# Patient Record
Sex: FEMALE | Race: BLACK OR AFRICAN AMERICAN | NOT HISPANIC OR LATINO
[De-identification: names, ages, dates, MRNs, and addresses within clinical notes are randomized per-mention and may not be internally consistent; named-entity substitution may affect disease eponyms.]

---

## 2017-03-24 ENCOUNTER — APPOINTMENT (OUTPATIENT)
Dept: INTERNAL MEDICINE | Facility: CLINIC | Age: 47
End: 2017-03-24

## 2017-03-24 ENCOUNTER — NON-APPOINTMENT (OUTPATIENT)
Age: 47
End: 2017-03-24

## 2017-03-24 VITALS
BODY MASS INDEX: 33.79 KG/M2 | HEIGHT: 70 IN | HEART RATE: 76 BPM | WEIGHT: 236 LBS | DIASTOLIC BLOOD PRESSURE: 80 MMHG | SYSTOLIC BLOOD PRESSURE: 130 MMHG | RESPIRATION RATE: 12 BRPM

## 2017-03-24 VITALS — TEMPERATURE: 98.3 F

## 2017-03-24 DIAGNOSIS — Z86.2 PERSONAL HISTORY OF DISEASES OF THE BLOOD AND BLOOD-FORMING ORGANS AND CERTAIN DISORDERS INVOLVING THE IMMUNE MECHANISM: ICD-10-CM

## 2017-03-25 LAB
ALBUMIN SERPL ELPH-MCNC: 4 G/DL
ALP BLD-CCNC: 89 U/L
ALT SERPL-CCNC: 10 U/L
ANION GAP SERPL CALC-SCNC: 17 MMOL/L
APPEARANCE: CLEAR
AST SERPL-CCNC: 14 U/L
BACTERIA: ABNORMAL
BASOPHILS # BLD AUTO: 0 K/UL
BASOPHILS NFR BLD AUTO: 0 %
BILIRUB SERPL-MCNC: 0.2 MG/DL
BILIRUBIN URINE: NEGATIVE
BLOOD URINE: NEGATIVE
BUN SERPL-MCNC: 14 MG/DL
CALCIUM SERPL-MCNC: 9.8 MG/DL
CHLORIDE SERPL-SCNC: 103 MMOL/L
CHOLEST SERPL-MCNC: 193 MG/DL
CHOLEST/HDLC SERPL: 3.6 RATIO
CO2 SERPL-SCNC: 23 MMOL/L
COLOR: YELLOW
CREAT SERPL-MCNC: 1.02 MG/DL
EOSINOPHIL # BLD AUTO: 0.21 K/UL
EOSINOPHIL NFR BLD AUTO: 3.1 %
GLUCOSE QUALITATIVE U: NORMAL MG/DL
GLUCOSE SERPL-MCNC: 86 MG/DL
HBA1C MFR BLD HPLC: 6.1 %
HCT VFR BLD CALC: 41.2 %
HDLC SERPL-MCNC: 53 MG/DL
HGB BLD-MCNC: 13.8 G/DL
HYALINE CASTS: 1 /LPF
IMM GRANULOCYTES NFR BLD AUTO: 0.1 %
KETONES URINE: NEGATIVE
LDLC SERPL CALC-MCNC: 119 MG/DL
LEUKOCYTE ESTERASE URINE: NEGATIVE
LYMPHOCYTES # BLD AUTO: 3.42 K/UL
LYMPHOCYTES NFR BLD AUTO: 49.9 %
MAN DIFF?: NORMAL
MCHC RBC-ENTMCNC: 29.6 PG
MCHC RBC-ENTMCNC: 33.5 GM/DL
MCV RBC AUTO: 88.4 FL
MICROSCOPIC-UA: NORMAL
MONOCYTES # BLD AUTO: 0.3 K/UL
MONOCYTES NFR BLD AUTO: 4.4 %
NEUTROPHILS # BLD AUTO: 2.91 K/UL
NEUTROPHILS NFR BLD AUTO: 42.5 %
NITRITE URINE: NEGATIVE
PH URINE: 6
PLATELET # BLD AUTO: 303 K/UL
POTASSIUM SERPL-SCNC: 4.6 MMOL/L
PROT SERPL-MCNC: 7.4 G/DL
PROTEIN URINE: NEGATIVE MG/DL
RBC # BLD: 4.66 M/UL
RBC # FLD: 13.6 %
RED BLOOD CELLS URINE: 3 /HPF
SODIUM SERPL-SCNC: 143 MMOL/L
SPECIFIC GRAVITY URINE: 1.02
SQUAMOUS EPITHELIAL CELLS: 12 /HPF
TRIGL SERPL-MCNC: 103 MG/DL
TSH SERPL-ACNC: 0.99 UIU/ML
UROBILINOGEN URINE: 1 MG/DL
WBC # FLD AUTO: 6.85 K/UL
WHITE BLOOD CELLS URINE: 1 /HPF

## 2017-03-29 ENCOUNTER — RESULT REVIEW (OUTPATIENT)
Age: 47
End: 2017-03-29

## 2017-04-28 ENCOUNTER — LABORATORY RESULT (OUTPATIENT)
Age: 47
End: 2017-04-28

## 2017-04-28 ENCOUNTER — APPOINTMENT (OUTPATIENT)
Dept: INTERNAL MEDICINE | Facility: CLINIC | Age: 47
End: 2017-04-28

## 2017-04-28 VITALS — HEART RATE: 70 BPM | SYSTOLIC BLOOD PRESSURE: 125 MMHG | DIASTOLIC BLOOD PRESSURE: 80 MMHG

## 2017-05-04 LAB
BASOPHILS # BLD AUTO: 0 K/UL
BASOPHILS NFR BLD AUTO: 0 %
EOSINOPHIL # BLD AUTO: 0.18 K/UL
EOSINOPHIL NFR BLD AUTO: 3.6 %
HCT VFR BLD CALC: 41.2 %
HGB BLD-MCNC: 13.3 G/DL
IMM GRANULOCYTES NFR BLD AUTO: 0 %
LYMPHOCYTES # BLD AUTO: 2.38 K/UL
LYMPHOCYTES NFR BLD AUTO: 47.1 %
MAN DIFF?: NORMAL
MCHC RBC-ENTMCNC: 28.4 PG
MCHC RBC-ENTMCNC: 32.3 GM/DL
MCV RBC AUTO: 88 FL
MONOCYTES # BLD AUTO: 0.31 K/UL
MONOCYTES NFR BLD AUTO: 6.1 %
NEUTROPHILS # BLD AUTO: 2.18 K/UL
NEUTROPHILS NFR BLD AUTO: 43.2 %
PLATELET # BLD AUTO: 355 K/UL
RBC # BLD: 4.68 M/UL
RBC # FLD: 13.3 %
WBC # FLD AUTO: 5.05 K/UL

## 2017-05-05 ENCOUNTER — APPOINTMENT (OUTPATIENT)
Dept: INTERNAL MEDICINE | Facility: CLINIC | Age: 47
End: 2017-05-05

## 2017-05-05 VITALS
DIASTOLIC BLOOD PRESSURE: 80 MMHG | HEIGHT: 70 IN | BODY MASS INDEX: 32.93 KG/M2 | HEART RATE: 75 BPM | WEIGHT: 230 LBS | SYSTOLIC BLOOD PRESSURE: 130 MMHG

## 2017-08-02 ENCOUNTER — RX RENEWAL (OUTPATIENT)
Age: 47
End: 2017-08-02

## 2017-10-05 ENCOUNTER — APPOINTMENT (OUTPATIENT)
Dept: INTERNAL MEDICINE | Facility: CLINIC | Age: 47
End: 2017-10-05
Payer: COMMERCIAL

## 2017-10-05 VITALS — HEART RATE: 70 BPM | SYSTOLIC BLOOD PRESSURE: 135 MMHG | DIASTOLIC BLOOD PRESSURE: 80 MMHG

## 2017-10-05 DIAGNOSIS — H93.8X2 OTHER SPECIFIED DISORDERS OF LEFT EAR: ICD-10-CM

## 2017-10-05 PROCEDURE — 99214 OFFICE O/P EST MOD 30 MIN: CPT

## 2017-10-09 ENCOUNTER — FORM ENCOUNTER (OUTPATIENT)
Age: 47
End: 2017-10-09

## 2017-10-10 ENCOUNTER — OUTPATIENT (OUTPATIENT)
Dept: OUTPATIENT SERVICES | Facility: HOSPITAL | Age: 47
LOS: 1 days | End: 2017-10-10
Payer: COMMERCIAL

## 2017-10-10 ENCOUNTER — APPOINTMENT (OUTPATIENT)
Dept: MRI IMAGING | Facility: CLINIC | Age: 47
End: 2017-10-10
Payer: COMMERCIAL

## 2017-10-10 DIAGNOSIS — N84.0 POLYP OF CORPUS UTERI: Chronic | ICD-10-CM

## 2017-10-10 DIAGNOSIS — M25.562 PAIN IN LEFT KNEE: ICD-10-CM

## 2017-10-10 PROCEDURE — 73721 MRI JNT OF LWR EXTRE W/O DYE: CPT

## 2017-10-10 PROCEDURE — 73721 MRI JNT OF LWR EXTRE W/O DYE: CPT | Mod: 26,LT

## 2017-10-18 ENCOUNTER — RESULT REVIEW (OUTPATIENT)
Age: 47
End: 2017-10-18

## 2017-10-25 ENCOUNTER — APPOINTMENT (OUTPATIENT)
Dept: ORTHOPEDIC SURGERY | Facility: CLINIC | Age: 47
End: 2017-10-25
Payer: COMMERCIAL

## 2017-10-25 VITALS
BODY MASS INDEX: 32.93 KG/M2 | HEART RATE: 67 BPM | DIASTOLIC BLOOD PRESSURE: 80 MMHG | SYSTOLIC BLOOD PRESSURE: 123 MMHG | HEIGHT: 70 IN | WEIGHT: 230 LBS

## 2017-10-25 DIAGNOSIS — M17.12 UNILATERAL PRIMARY OSTEOARTHRITIS, LEFT KNEE: ICD-10-CM

## 2017-10-25 PROCEDURE — 99204 OFFICE O/P NEW MOD 45 MIN: CPT

## 2017-10-25 PROCEDURE — 73562 X-RAY EXAM OF KNEE 3: CPT | Mod: LT

## 2017-10-25 RX ORDER — MOMETASONE 50 UG/1
50 SPRAY, METERED NASAL DAILY
Qty: 17 | Refills: 1 | Status: DISCONTINUED | COMMUNITY
Start: 2017-05-05 | End: 2017-10-25

## 2017-12-14 ENCOUNTER — APPOINTMENT (OUTPATIENT)
Dept: INTERNAL MEDICINE | Facility: CLINIC | Age: 47
End: 2017-12-14
Payer: COMMERCIAL

## 2017-12-14 ENCOUNTER — APPOINTMENT (OUTPATIENT)
Dept: OBGYN | Facility: CLINIC | Age: 47
End: 2017-12-14

## 2017-12-14 VITALS
BODY MASS INDEX: 32.93 KG/M2 | WEIGHT: 230 LBS | HEIGHT: 70 IN | HEART RATE: 78 BPM | DIASTOLIC BLOOD PRESSURE: 80 MMHG | SYSTOLIC BLOOD PRESSURE: 125 MMHG

## 2017-12-14 PROCEDURE — 99214 OFFICE O/P EST MOD 30 MIN: CPT

## 2018-01-18 ENCOUNTER — APPOINTMENT (OUTPATIENT)
Dept: INTERNAL MEDICINE | Facility: CLINIC | Age: 48
End: 2018-01-18
Payer: COMMERCIAL

## 2018-01-18 VITALS
BODY MASS INDEX: 35.36 KG/M2 | HEART RATE: 78 BPM | SYSTOLIC BLOOD PRESSURE: 130 MMHG | DIASTOLIC BLOOD PRESSURE: 80 MMHG | OXYGEN SATURATION: 97 % | HEIGHT: 70 IN | WEIGHT: 247 LBS

## 2018-01-18 DIAGNOSIS — M25.562 PAIN IN LEFT KNEE: ICD-10-CM

## 2018-01-18 DIAGNOSIS — I07.1 RHEUMATIC TRICUSPID INSUFFICIENCY: ICD-10-CM

## 2018-01-18 DIAGNOSIS — L02.92 FURUNCLE, UNSPECIFIED: ICD-10-CM

## 2018-01-18 PROCEDURE — 99214 OFFICE O/P EST MOD 30 MIN: CPT | Mod: 25

## 2018-01-18 PROCEDURE — 36415 COLL VENOUS BLD VENIPUNCTURE: CPT

## 2018-01-18 RX ORDER — MELOXICAM 7.5 MG/1
7.5 TABLET ORAL TWICE DAILY
Qty: 60 | Refills: 1 | Status: DISCONTINUED | COMMUNITY
Start: 2017-10-25 | End: 2018-01-18

## 2018-01-19 LAB
ALBUMIN SERPL ELPH-MCNC: 3.9 G/DL
ALP BLD-CCNC: 82 U/L
ALT SERPL-CCNC: 10 U/L
ANION GAP SERPL CALC-SCNC: 15 MMOL/L
AST SERPL-CCNC: 15 U/L
BASOPHILS # BLD AUTO: 0.01 K/UL
BASOPHILS NFR BLD AUTO: 0.2 %
BILIRUB SERPL-MCNC: 0.3 MG/DL
BUN SERPL-MCNC: 11 MG/DL
CALCIUM SERPL-MCNC: 9.5 MG/DL
CHLORIDE SERPL-SCNC: 99 MMOL/L
CHOLEST SERPL-MCNC: 194 MG/DL
CHOLEST/HDLC SERPL: 4.7 RATIO
CO2 SERPL-SCNC: 23 MMOL/L
CREAT SERPL-MCNC: 1.03 MG/DL
EOSINOPHIL # BLD AUTO: 0.19 K/UL
EOSINOPHIL NFR BLD AUTO: 2.9 %
GLUCOSE SERPL-MCNC: 158 MG/DL
HBA1C MFR BLD HPLC: 6.2 %
HCT VFR BLD CALC: 39.9 %
HDLC SERPL-MCNC: 41 MG/DL
HGB BLD-MCNC: 12.7 G/DL
IMM GRANULOCYTES NFR BLD AUTO: 0.2 %
LDLC SERPL CALC-MCNC: 119 MG/DL
LYMPHOCYTES # BLD AUTO: 3.28 K/UL
LYMPHOCYTES NFR BLD AUTO: 50.2 %
MAN DIFF?: NORMAL
MCHC RBC-ENTMCNC: 28.3 PG
MCHC RBC-ENTMCNC: 31.8 GM/DL
MCV RBC AUTO: 88.9 FL
MONOCYTES # BLD AUTO: 0.24 K/UL
MONOCYTES NFR BLD AUTO: 3.7 %
NEUTROPHILS # BLD AUTO: 2.8 K/UL
NEUTROPHILS NFR BLD AUTO: 42.8 %
PLATELET # BLD AUTO: 314 K/UL
POTASSIUM SERPL-SCNC: 4 MMOL/L
PROT SERPL-MCNC: 7.3 G/DL
RBC # BLD: 4.49 M/UL
RBC # FLD: 13.5 %
SODIUM SERPL-SCNC: 137 MMOL/L
TRIGL SERPL-MCNC: 172 MG/DL
TSH SERPL-ACNC: 1.12 UIU/ML
WBC # FLD AUTO: 6.53 K/UL

## 2018-01-26 ENCOUNTER — RESULT REVIEW (OUTPATIENT)
Age: 48
End: 2018-01-26

## 2018-04-12 ENCOUNTER — APPOINTMENT (OUTPATIENT)
Dept: INTERNAL MEDICINE | Facility: CLINIC | Age: 48
End: 2018-04-12
Payer: COMMERCIAL

## 2018-04-12 ENCOUNTER — NON-APPOINTMENT (OUTPATIENT)
Age: 48
End: 2018-04-12

## 2018-04-12 VITALS
HEART RATE: 62 BPM | RESPIRATION RATE: 14 BRPM | WEIGHT: 247 LBS | DIASTOLIC BLOOD PRESSURE: 78 MMHG | SYSTOLIC BLOOD PRESSURE: 116 MMHG | HEIGHT: 70 IN | BODY MASS INDEX: 35.36 KG/M2

## 2018-04-12 PROCEDURE — 99396 PREV VISIT EST AGE 40-64: CPT | Mod: 25

## 2018-04-12 PROCEDURE — 93000 ELECTROCARDIOGRAM COMPLETE: CPT

## 2018-04-12 PROCEDURE — 36415 COLL VENOUS BLD VENIPUNCTURE: CPT

## 2018-04-12 RX ORDER — CEPHALEXIN 500 MG/1
500 CAPSULE ORAL
Qty: 28 | Refills: 0 | Status: DISCONTINUED | COMMUNITY
Start: 2017-12-11

## 2018-04-12 RX ORDER — SULFAMETHOXAZOLE AND TRIMETHOPRIM 800; 160 MG/1; MG/1
800-160 TABLET ORAL
Qty: 14 | Refills: 0 | Status: DISCONTINUED | COMMUNITY
Start: 2017-12-11

## 2018-04-13 ENCOUNTER — RESULT REVIEW (OUTPATIENT)
Age: 48
End: 2018-04-13

## 2018-04-13 LAB
ALBUMIN SERPL ELPH-MCNC: 4.1 G/DL
ALP BLD-CCNC: 85 U/L
ALT SERPL-CCNC: 8 U/L
ANION GAP SERPL CALC-SCNC: 12 MMOL/L
APPEARANCE: CLEAR
AST SERPL-CCNC: 11 U/L
BACTERIA: NEGATIVE
BASOPHILS # BLD AUTO: 0.01 K/UL
BASOPHILS NFR BLD AUTO: 0.2 %
BILIRUB SERPL-MCNC: 0.3 MG/DL
BILIRUBIN URINE: NEGATIVE
BLOOD URINE: NEGATIVE
BUN SERPL-MCNC: 15 MG/DL
CALCIUM SERPL-MCNC: 9.7 MG/DL
CHLORIDE SERPL-SCNC: 101 MMOL/L
CHOLEST SERPL-MCNC: 177 MG/DL
CHOLEST/HDLC SERPL: 3.3 RATIO
CO2 SERPL-SCNC: 26 MMOL/L
COLOR: YELLOW
CREAT SERPL-MCNC: 0.96 MG/DL
EOSINOPHIL # BLD AUTO: 0.21 K/UL
EOSINOPHIL NFR BLD AUTO: 3.8 %
GLUCOSE QUALITATIVE U: NEGATIVE MG/DL
GLUCOSE SERPL-MCNC: 105 MG/DL
HBA1C MFR BLD HPLC: 6.2 %
HCT VFR BLD CALC: 40.9 %
HDLC SERPL-MCNC: 54 MG/DL
HGB BLD-MCNC: 13.5 G/DL
HYALINE CASTS: 0 /LPF
IMM GRANULOCYTES NFR BLD AUTO: 0.2 %
KETONES URINE: NEGATIVE
LDLC SERPL CALC-MCNC: 104 MG/DL
LEUKOCYTE ESTERASE URINE: NEGATIVE
LYMPHOCYTES # BLD AUTO: 2.61 K/UL
LYMPHOCYTES NFR BLD AUTO: 46.9 %
MAN DIFF?: NORMAL
MCHC RBC-ENTMCNC: 29.4 PG
MCHC RBC-ENTMCNC: 33 GM/DL
MCV RBC AUTO: 89.1 FL
MICROSCOPIC-UA: NORMAL
MONOCYTES # BLD AUTO: 0.33 K/UL
MONOCYTES NFR BLD AUTO: 5.9 %
NEUTROPHILS # BLD AUTO: 2.39 K/UL
NEUTROPHILS NFR BLD AUTO: 43 %
NITRITE URINE: NEGATIVE
PH URINE: 5.5
PLATELET # BLD AUTO: 330 K/UL
POTASSIUM SERPL-SCNC: 4.6 MMOL/L
PROT SERPL-MCNC: 7.2 G/DL
PROTEIN URINE: NEGATIVE MG/DL
RBC # BLD: 4.59 M/UL
RBC # FLD: 13.6 %
RED BLOOD CELLS URINE: 0 /HPF
SODIUM SERPL-SCNC: 139 MMOL/L
SPECIFIC GRAVITY URINE: 1.02
SQUAMOUS EPITHELIAL CELLS: 8 /HPF
TRIGL SERPL-MCNC: 96 MG/DL
TSH SERPL-ACNC: 1.34 UIU/ML
UROBILINOGEN URINE: NEGATIVE MG/DL
WBC # FLD AUTO: 5.56 K/UL
WHITE BLOOD CELLS URINE: 0 /HPF

## 2018-04-23 ENCOUNTER — TRANSCRIPTION ENCOUNTER (OUTPATIENT)
Age: 48
End: 2018-04-23

## 2018-09-03 ENCOUNTER — TRANSCRIPTION ENCOUNTER (OUTPATIENT)
Age: 48
End: 2018-09-03

## 2018-09-03 PROBLEM — I07.1 MILD TRICUSPID REGURGITATION: Status: ACTIVE | Noted: 2018-01-18

## 2018-10-09 ENCOUNTER — APPOINTMENT (OUTPATIENT)
Dept: INTERNAL MEDICINE | Facility: CLINIC | Age: 48
End: 2018-10-09

## 2019-05-06 ENCOUNTER — APPOINTMENT (OUTPATIENT)
Dept: INTERNAL MEDICINE | Facility: CLINIC | Age: 49
End: 2019-05-06
Payer: COMMERCIAL

## 2019-05-06 ENCOUNTER — NON-APPOINTMENT (OUTPATIENT)
Age: 49
End: 2019-05-06

## 2019-05-06 VITALS
WEIGHT: 255.5 LBS | DIASTOLIC BLOOD PRESSURE: 76 MMHG | HEIGHT: 70 IN | SYSTOLIC BLOOD PRESSURE: 128 MMHG | BODY MASS INDEX: 36.58 KG/M2 | HEART RATE: 71 BPM | RESPIRATION RATE: 14 BRPM

## 2019-05-06 LAB — CYTOLOGY CVX/VAG DOC THIN PREP: NORMAL

## 2019-05-06 PROCEDURE — 36415 COLL VENOUS BLD VENIPUNCTURE: CPT

## 2019-05-06 PROCEDURE — 99396 PREV VISIT EST AGE 40-64: CPT | Mod: 25

## 2019-05-06 PROCEDURE — 93000 ELECTROCARDIOGRAM COMPLETE: CPT

## 2019-05-06 NOTE — PHYSICAL EXAM
[General Appearance - Alert] : alert [General Appearance - In No Acute Distress] : in no acute distress [Sclera] : the sclera and conjunctiva were normal [Extraocular Movements] : extraocular movements were intact [PERRL With Normal Accommodation] : pupils were equal in size, round, and reactive to light [Outer Ear] : the ears and nose were normal in appearance [Oropharynx] : the oropharynx was normal [Neck Appearance] : the appearance of the neck was normal [Neck Cervical Mass (___cm)] : no neck mass was observed [Jugular Venous Distention Increased] : there was no jugular-venous distention [Thyroid Diffuse Enlargement] : the thyroid was not enlarged [Auscultation Breath Sounds / Voice Sounds] : lungs were clear to auscultation bilaterally [Heart Rate And Rhythm] : heart rate was normal and rhythm regular [Heart Sounds] : normal S1 and S2 [Heart Sounds Gallop] : no gallops [Murmurs] : no murmurs [Heart Sounds Pericardial Friction Rub] : no pericardial rub [Arterial Pulses Carotid] : carotid pulses were normal with no bruits [Abdominal Aorta] : the abdominal aorta was normal [Full Pulse] : the pedal pulses are present [Edema] : there was no peripheral edema [Bowel Sounds] : normal bowel sounds [Abdomen Soft] : soft [Abdomen Tenderness] : non-tender [Abdomen Mass (___ Cm)] : no abdominal mass palpated [Cervical Lymph Nodes Enlarged Posterior Bilaterally] : posterior cervical [Cervical Lymph Nodes Enlarged Anterior Bilaterally] : anterior cervical [Supraclavicular Lymph Nodes Enlarged Bilaterally] : supraclavicular [Axillary Lymph Nodes Enlarged Bilaterally] : axillary [Femoral Lymph Nodes Enlarged Bilaterally] : femoral [Inguinal Lymph Nodes Enlarged Bilaterally] : inguinal [No Spinal Tenderness] : no spinal tenderness [Abnormal Walk] : normal gait [Nail Clubbing] : no clubbing  or cyanosis of the fingernails [Musculoskeletal - Swelling] : no joint swelling seen [Motor Tone] : muscle strength and tone were normal [Skin Color & Pigmentation] : normal skin color and pigmentation [Skin Turgor] : normal skin turgor [] : no rash [Cranial Nerves] : cranial nerves 2-12 were intact [No Focal Deficits] : no focal deficits [Oriented To Time, Place, And Person] : oriented to person, place, and time [Impaired Insight] : insight and judgment were intact [Affect] : the affect was normal [FreeTextEntry1] : Umbilical keloids [Over the Past 2 Weeks, Have You Felt Down, Depressed, or Hopeless?] : 1.) Over the past 2 weeks, have you felt down, depressed, or hopeless? No [Over the Past 2 Weeks, Have You Felt Little Interest or Pleasure Doing Things?] : 2.) Over the past 2 weeks, have you felt little interest or pleasure doing things? No

## 2019-05-06 NOTE — REVIEW OF SYSTEMS
[Feeling Tired] : feeling tired [Recent Weight Gain (___ Lbs)] : recent [unfilled] ~Ulb weight gain [Negative] : Heme/Lymph [Fever] : no fever [Chills] : no chills [Feeling Poorly] : not feeling poorly

## 2019-05-06 NOTE — HISTORY OF PRESENT ILLNESS
[Weight Gain ___ Pounds] : Weight gain of [unfilled] pounds [None] : No weight lost attempts [Sedentary] : Patient is sedentary [Contemplation] : Contemplation: patient is considering to lose weight within the next 6 months, patient did not attempt to lose weight in the last year. [Needs reinforcement, provided] : Patient needs reinforcement on understanding of disease, goals and obesity follow-up plan; reinforcement was provided [FreeTextEntry1] : 49-year-old female in for her yearly physical.\par \par  Patient generally feeling well without particular complaints but continues with no lifestyle changes.Therefore no weight loss\par \par Patient with known history of prediabetes with significant increased risk of diabetes secondary to family history and history of gestational diabetes.\par \par Patient has significant dysfunctional uterine bleeding and ultimately had a robotic assisted hysterectomy October 2015 with resolution of her anemia.\par \par Patient is generally feeling well without complaints including no chest pain, palpitations, shortness of breath or edema.\par \par The only active issue is the patient has a left thyroid nodule for which she was following with endocrinologist Dr. Leahy.She had an FNA biopsy June of 2017= benign.\par Last year the patient was given a referral for a thyroid ultrasound which she did not do.\par \par The patient had was a partial torn meniscus of her left knee October 2017.\par For this she saw orthopedic Dr Starkey who referred her for physical therapy which she did not do and her knee has improved.\par Her knee is bothering her again and is contemplating physical therapy.\par \par The patient does complain of a discomfort of her upper mid abdomen with a bulge and is concerned about a hernia. She states has gotten bigger.

## 2019-05-06 NOTE — HEALTH RISK ASSESSMENT
[Fair] : ~his/her~ current health as fair  [Very Good] : ~his/her~  mood as very good [No falls in past year] : Patient reported no falls in the past year [0] : 2) Feeling down, depressed, or hopeless: Not at all (0) [None] : None [With Significant Other] : lives with significant other [With Family] : lives with family [# of Members in Household ___] :  household currently consist of [unfilled] member(s) [Employed] : employed [College] : College [] :  [# Of Children ___] : has [unfilled] children [Sexually Active] : sexually active [Feels Safe at Home] : Feels safe at home [Fully functional (bathing, dressing, toileting, transferring, walking, feeding)] : Fully functional (bathing, dressing, toileting, transferring, walking, feeding) [Fully functional (using the telephone, shopping, preparing meals, housekeeping, doing laundry, using] : Fully functional and needs no help or supervision to perform IADLs (using the telephone, shopping, preparing meals, housekeeping, doing laundry, using transportation, managing medications and managing finances) [Reports changes in vision] : Reports changes in vision [Smoke Detector] : smoke detector [Carbon Monoxide Detector] : carbon monoxide detector [Guns at Home] : guns at home [Seat Belt] :  uses seat belt [Reviewed no changes] : Reviewed no changes [Discussed at today's visit] : Advance Directives Discussed at today's visit [] : No [[id number]] : 0 [Change in mental status noted] : No change in mental status noted [Reports changes in hearing] : Reports no changes in hearing [Reports changes in dental health] : Reports no changes in dental health [Sunscreen] : does not use sunscreen [FreeTextEntry2] : 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

## 2019-05-06 NOTE — COUNSELING
[Weight management counseling provided] : Weight management [Healthy eating counseling provided] : healthy eating [Activity counseling provided] : activity [Low Fat Diet] : Low fat diet [Decrease Portions] : Decrease food portions [None] : None [Needs reinforcement, provided] : Patient needs reinforcement on understanding lifestyle changes and  the steps needed to achieve self management goals and reinforcement was provided [ - Behavioral Counseling for Obesity, Group (2-10) 30 Minutes] : Behavioral Counseling for Obesity, Group (2-10) 30 Minutes

## 2019-05-06 NOTE — ASSESSMENT
[FreeTextEntry1] : 49-year-old female with known prediabetes at increased risk for type 2 diabetes continues to need lifestyle changes with diet and exercise. Patient again encouraged a low carb diet with regular exercise and resultant weight loss. Goal is for a 10-15 pound weight loss in the next 3-4 months.\par \par Questionable upper abdominal ventral hernia therefore referred to surgery for evaluation\par \par Thyroid sonogram done November 2016. Referral again given for followup\par Echocardiogram September 2015\par Mammography November 2016. Referral again given\par GYN followup scheduled\par GI referral given for colonoscopy\par Patient referred to nutritionist\par \par Followup yearly and as needed

## 2019-05-13 LAB
ALBUMIN SERPL ELPH-MCNC: 4.3 G/DL
ALP BLD-CCNC: 104 U/L
ALT SERPL-CCNC: 17 U/L
ANION GAP SERPL CALC-SCNC: 13 MMOL/L
APPEARANCE: ABNORMAL
AST SERPL-CCNC: 15 U/L
BACTERIA: ABNORMAL
BASOPHILS # BLD AUTO: 0.01 K/UL
BASOPHILS NFR BLD AUTO: 0.2 %
BILIRUB SERPL-MCNC: 0.5 MG/DL
BILIRUBIN URINE: NEGATIVE
BLOOD URINE: NEGATIVE
BUN SERPL-MCNC: 14 MG/DL
CALCIUM SERPL-MCNC: 9.4 MG/DL
CHLORIDE SERPL-SCNC: 99 MMOL/L
CHOLEST SERPL-MCNC: 191 MG/DL
CHOLEST/HDLC SERPL: 4.1 RATIO
CO2 SERPL-SCNC: 26 MMOL/L
COLOR: YELLOW
CREAT SERPL-MCNC: 0.94 MG/DL
EOSINOPHIL # BLD AUTO: 0.17 K/UL
EOSINOPHIL NFR BLD AUTO: 2.8 %
ESTIMATED AVERAGE GLUCOSE: 163 MG/DL
GLUCOSE QUALITATIVE U: NEGATIVE
GLUCOSE SERPL-MCNC: 131 MG/DL
HBA1C MFR BLD HPLC: 7.3 %
HCT VFR BLD CALC: 44.4 %
HDLC SERPL-MCNC: 47 MG/DL
HGB BLD-MCNC: 13.6 G/DL
HYALINE CASTS: 6 /LPF
IMM GRANULOCYTES NFR BLD AUTO: 0.2 %
KETONES URINE: NEGATIVE
LDLC SERPL CALC-MCNC: 125 MG/DL
LEUKOCYTE ESTERASE URINE: NEGATIVE
LYMPHOCYTES # BLD AUTO: 2.85 K/UL
LYMPHOCYTES NFR BLD AUTO: 46.6 %
MAN DIFF?: NORMAL
MCHC RBC-ENTMCNC: 28 PG
MCHC RBC-ENTMCNC: 30.6 GM/DL
MCV RBC AUTO: 91.5 FL
MICROSCOPIC-UA: NORMAL
MONOCYTES # BLD AUTO: 0.31 K/UL
MONOCYTES NFR BLD AUTO: 5.1 %
NEUTROPHILS # BLD AUTO: 2.76 K/UL
NEUTROPHILS NFR BLD AUTO: 45.1 %
NITRITE URINE: NEGATIVE
PH URINE: 6.5
PLATELET # BLD AUTO: 327 K/UL
POTASSIUM SERPL-SCNC: 4.2 MMOL/L
PROT SERPL-MCNC: 7 G/DL
PROTEIN URINE: NORMAL
RBC # BLD: 4.85 M/UL
RBC # FLD: 13.1 %
RED BLOOD CELLS URINE: 6 /HPF
SODIUM SERPL-SCNC: 138 MMOL/L
SPECIFIC GRAVITY URINE: 1.02
SQUAMOUS EPITHELIAL CELLS: 11 /HPF
TRIGL SERPL-MCNC: 95 MG/DL
TSH SERPL-ACNC: 0.79 UIU/ML
UROBILINOGEN URINE: NORMAL
WBC # FLD AUTO: 6.11 K/UL
WHITE BLOOD CELLS URINE: 5 /HPF

## 2019-06-19 ENCOUNTER — APPOINTMENT (OUTPATIENT)
Dept: OBGYN | Facility: CLINIC | Age: 49
End: 2019-06-19
Payer: COMMERCIAL

## 2019-06-19 ENCOUNTER — APPOINTMENT (OUTPATIENT)
Dept: SURGERY | Facility: CLINIC | Age: 49
End: 2019-06-19
Payer: COMMERCIAL

## 2019-06-19 VITALS
OXYGEN SATURATION: 97 % | HEIGHT: 70 IN | BODY MASS INDEX: 36.79 KG/M2 | SYSTOLIC BLOOD PRESSURE: 138 MMHG | HEART RATE: 64 BPM | WEIGHT: 257 LBS | DIASTOLIC BLOOD PRESSURE: 89 MMHG | TEMPERATURE: 98.3 F

## 2019-06-19 VITALS
HEIGHT: 70 IN | BODY MASS INDEX: 36.65 KG/M2 | HEART RATE: 71 BPM | WEIGHT: 256 LBS | DIASTOLIC BLOOD PRESSURE: 85 MMHG | SYSTOLIC BLOOD PRESSURE: 130 MMHG

## 2019-06-19 DIAGNOSIS — R10.9 UNSPECIFIED ABDOMINAL PAIN: ICD-10-CM

## 2019-06-19 DIAGNOSIS — R19.00 INTRA-ABDOMINAL AND PELVIC SWELLING, MASS AND LUMP, UNSPECIFIED SITE: ICD-10-CM

## 2019-06-19 DIAGNOSIS — R23.2 FLUSHING: ICD-10-CM

## 2019-06-19 PROCEDURE — 99244 OFF/OP CNSLTJ NEW/EST MOD 40: CPT

## 2019-06-19 PROCEDURE — 99214 OFFICE O/P EST MOD 30 MIN: CPT

## 2019-06-19 NOTE — PHYSICAL EXAM
[No Rash or Lesion] : No rash or lesion [JVD] : no jugular venous distention  [Petechiae] : no petechiae [Purpura] : no purpura  [Skin Induration] : no induration [Skin Ulcer] : no ulcer [Alert] : alert [Oriented to Person] : oriented to person [Oriented to Place] : oriented to place [Calm] : calm [Oriented to Time] : oriented to time [de-identified] : non toxic, in no acute distress  [de-identified] : trachea midline, no gross mass  [de-identified] : NC/AT PERRL EOMI no scleral icterus  [de-identified] : no audible wheezing or stridor  [de-identified] : FROM of all extremities with no gross deformity or angulation there is no lymphadenopathy, no angulation, there is bulge of the upper abdomen that appears to be consistent with diastasis rectus, there is no clear fascial defect to suggest a ventral hernia however the exam is limited by the patient's body habitus  [de-identified] : obese soft, no localizing tenderness, no guarding, no rebound, no masses  [de-identified] : mood is calm

## 2019-06-19 NOTE — HISTORY OF PRESENT ILLNESS
[de-identified] : The patient comes to the office in consultation by Dr. Slim Crystal for evaluation of an abdominal wall bulge and intermittent burning pain in the upper abdominal area.  The patient reports that with exertion she experiences pain following eating at times and then with exertion. She notes that her upper abdomen will pop out and bulge thru her clothing.  She has noted this become worse over the past 3 to 6 months.  She denies any significant change in her weight. She has no nausea, no vomit, no changes in her bowel function.

## 2019-06-19 NOTE — CONSULT LETTER
[Dear  ___] : Dear  [unfilled], [Consult Letter:] : I had the pleasure of evaluating your patient, [unfilled]. [( Thank you for referring [unfilled] for consultation for _____ )] : Thank you for referring [unfilled] for consultation for [unfilled] [Please see my note below.] : Please see my note below. [Consult Closing:] : Thank you very much for allowing me to participate in the care of this patient.  If you have any questions, please do not hesitate to contact me. [FreeTextEntry3] : Darryn Esquivel MD, FACS\par  of Surgery\par [facility]\par  [Sincerely,] : Sincerely,

## 2019-06-19 NOTE — ASSESSMENT
[FreeTextEntry1] : The patient is a 49 year old female with a bulge and intermittent burning pain in the upper abdominal area. There is clinically a component of diastasis rectus.  The possibility of an underlying ventral hernia is suspected based on the pain, but exam is limited by the patient's body habitus.  The patient will benefit from a CT scan to exclude a hernia.  The patient has been advised that weight loss will be an important adjunct to help potentially reduce the  the tension along the abdominal wall that will potentially lead to worsening and potential herniation of the abdominal wall.  She will return upon completion of the CT scan for further recommendations. \par

## 2019-06-20 ENCOUNTER — RESULT REVIEW (OUTPATIENT)
Age: 49
End: 2019-06-20

## 2019-06-20 LAB
APPEARANCE: CLEAR
BACTERIA: NEGATIVE
BILIRUBIN URINE: NEGATIVE
BLOOD URINE: NEGATIVE
C TRACH RRNA SPEC QL NAA+PROBE: NOT DETECTED
COLOR: YELLOW
GLUCOSE QUALITATIVE U: NEGATIVE
HPV HIGH+LOW RISK DNA PNL CVX: NOT DETECTED
HYALINE CASTS: 3 /LPF
KETONES URINE: NEGATIVE
LEUKOCYTE ESTERASE URINE: NEGATIVE
MICROSCOPIC-UA: NORMAL
N GONORRHOEA RRNA SPEC QL NAA+PROBE: NOT DETECTED
NITRITE URINE: NEGATIVE
PH URINE: 5.5
PROTEIN URINE: NORMAL
RED BLOOD CELLS URINE: 1 /HPF
SOURCE TP AMPLIFICATION: NORMAL
SPECIFIC GRAVITY URINE: 1.02
SQUAMOUS EPITHELIAL CELLS: 5 /HPF
UROBILINOGEN URINE: NORMAL
WHITE BLOOD CELLS URINE: 1 /HPF

## 2019-06-21 ENCOUNTER — OUTPATIENT (OUTPATIENT)
Dept: OUTPATIENT SERVICES | Facility: HOSPITAL | Age: 49
LOS: 1 days | End: 2019-06-21
Payer: COMMERCIAL

## 2019-06-21 ENCOUNTER — APPOINTMENT (OUTPATIENT)
Dept: CT IMAGING | Facility: CLINIC | Age: 49
End: 2019-06-21
Payer: COMMERCIAL

## 2019-06-21 DIAGNOSIS — Z00.8 ENCOUNTER FOR OTHER GENERAL EXAMINATION: ICD-10-CM

## 2019-06-21 DIAGNOSIS — N84.0 POLYP OF CORPUS UTERI: Chronic | ICD-10-CM

## 2019-06-21 DIAGNOSIS — R19.00 INTRA-ABDOMINAL AND PELVIC SWELLING, MASS AND LUMP, UNSPECIFIED SITE: ICD-10-CM

## 2019-06-21 DIAGNOSIS — R10.9 UNSPECIFIED ABDOMINAL PAIN: ICD-10-CM

## 2019-06-21 PROCEDURE — 74177 CT ABD & PELVIS W/CONTRAST: CPT | Mod: 26

## 2019-06-21 PROCEDURE — 74177 CT ABD & PELVIS W/CONTRAST: CPT

## 2019-06-24 LAB — CYTOLOGY CVX/VAG DOC THIN PREP: NORMAL

## 2019-06-26 ENCOUNTER — APPOINTMENT (OUTPATIENT)
Dept: CHRONIC DISEASE MANAGEMENT | Facility: CLINIC | Age: 49
End: 2019-06-26

## 2019-07-03 ENCOUNTER — APPOINTMENT (OUTPATIENT)
Dept: CHRONIC DISEASE MANAGEMENT | Facility: CLINIC | Age: 49
End: 2019-07-03
Payer: COMMERCIAL

## 2019-07-03 VITALS — HEIGHT: 70 IN | BODY MASS INDEX: 36.65 KG/M2 | WEIGHT: 256 LBS

## 2019-07-03 PROCEDURE — G0108 DIAB MANAGE TRN  PER INDIV: CPT

## 2019-07-16 ENCOUNTER — APPOINTMENT (OUTPATIENT)
Dept: INTERNAL MEDICINE | Facility: CLINIC | Age: 49
End: 2019-07-16
Payer: COMMERCIAL

## 2019-07-16 VITALS
WEIGHT: 256 LBS | SYSTOLIC BLOOD PRESSURE: 125 MMHG | HEIGHT: 70 IN | BODY MASS INDEX: 36.65 KG/M2 | HEART RATE: 71 BPM | OXYGEN SATURATION: 97 % | DIASTOLIC BLOOD PRESSURE: 85 MMHG

## 2019-07-16 DIAGNOSIS — R73.03 PREDIABETES.: ICD-10-CM

## 2019-07-16 PROCEDURE — 99213 OFFICE O/P EST LOW 20 MIN: CPT | Mod: 25

## 2019-07-16 PROCEDURE — 36415 COLL VENOUS BLD VENIPUNCTURE: CPT

## 2019-07-16 RX ORDER — CETIRIZINE HYDROCHLORIDE 10 MG/1
10 TABLET, FILM COATED ORAL
Refills: 0 | Status: DISCONTINUED | COMMUNITY
Start: 2019-05-06 | End: 2019-07-16

## 2019-07-17 DIAGNOSIS — H40.9 UNSPECIFIED GLAUCOMA: ICD-10-CM

## 2019-07-17 LAB
ALBUMIN SERPL ELPH-MCNC: 4.4 G/DL
ALP BLD-CCNC: 84 U/L
ALT SERPL-CCNC: 23 U/L
ANION GAP SERPL CALC-SCNC: 15 MMOL/L
AST SERPL-CCNC: 21 U/L
BASOPHILS # BLD AUTO: 0.01 K/UL
BASOPHILS NFR BLD AUTO: 0.2 %
BILIRUB SERPL-MCNC: 0.4 MG/DL
BUN SERPL-MCNC: 13 MG/DL
CALCIUM SERPL-MCNC: 9.6 MG/DL
CHLORIDE SERPL-SCNC: 103 MMOL/L
CHOLEST SERPL-MCNC: 177 MG/DL
CHOLEST/HDLC SERPL: 4.3 RATIO
CO2 SERPL-SCNC: 24 MMOL/L
CREAT SERPL-MCNC: 0.95 MG/DL
CREAT SPEC-SCNC: 152 MG/DL
EOSINOPHIL # BLD AUTO: 0.17 K/UL
EOSINOPHIL NFR BLD AUTO: 3.1 %
ESTIMATED AVERAGE GLUCOSE: 157 MG/DL
GLUCOSE SERPL-MCNC: 123 MG/DL
HBA1C MFR BLD HPLC: 7.1 %
HCT VFR BLD CALC: 42.4 %
HDLC SERPL-MCNC: 41 MG/DL
HGB BLD-MCNC: 13.3 G/DL
IMM GRANULOCYTES NFR BLD AUTO: 0.2 %
LDLC SERPL CALC-MCNC: 115 MG/DL
LYMPHOCYTES # BLD AUTO: 2.8 K/UL
LYMPHOCYTES NFR BLD AUTO: 50.8 %
MAN DIFF?: NORMAL
MCHC RBC-ENTMCNC: 28.7 PG
MCHC RBC-ENTMCNC: 31.4 GM/DL
MCV RBC AUTO: 91.6 FL
MICROALBUMIN 24H UR DL<=1MG/L-MCNC: 4.2 MG/DL
MICROALBUMIN/CREAT 24H UR-RTO: 28 MG/G
MONOCYTES # BLD AUTO: 0.33 K/UL
MONOCYTES NFR BLD AUTO: 6 %
NEUTROPHILS # BLD AUTO: 2.19 K/UL
NEUTROPHILS NFR BLD AUTO: 39.7 %
PLATELET # BLD AUTO: 324 K/UL
POTASSIUM SERPL-SCNC: 4.1 MMOL/L
PROT SERPL-MCNC: 7.4 G/DL
RBC # BLD: 4.63 M/UL
RBC # FLD: 13.1 %
SODIUM SERPL-SCNC: 142 MMOL/L
TRIGL SERPL-MCNC: 105 MG/DL
TSH SERPL-ACNC: 0.66 UIU/ML
WBC # FLD AUTO: 5.51 K/UL

## 2019-07-24 PROBLEM — H40.9 GLAUCOMA: Status: ACTIVE | Noted: 2019-07-24

## 2019-07-29 ENCOUNTER — RESULT REVIEW (OUTPATIENT)
Age: 49
End: 2019-07-29

## 2019-07-29 NOTE — ADDENDUM
[FreeTextEntry1] : Labs show\par -Cholesterol/ diabetes still uncontrolled= again recommend starting Crestor and metformin=PT DECLINES

## 2019-07-29 NOTE — ASSESSMENT
[FreeTextEntry1] : Labs sent out and further recommendations will be made based on lab results.Patient continues to need lifestyle changes with weight loss. Patient will return to office in 3-4 months\par \par \par echo was 9/15 with mild MR/mild TR-Rx given for follow up\par Thyroid sonogram=UTD per pt-to call for report\par Mammogram was 5/2019\par specialists include\par 1. GYN=Dr. Storey\par 2. Endocrinology-Dr. Leahy-prn-to go if thyroid sono changes\par 3.hematology-Dr. Junior-declines follow up\par 4. Orthopedic-Dr. Starkey-no need for follow up"knee is fine now"\par 5.ophthalmology-Dr. Roberts-11/2018\par 6.Surgeon-Dr. Esquivel\par declines flu vaccine,TDAP is UTD/discussed pneumococcal vaccine\par colonoscopy-has consult apt sched\par Microalbumin sent out

## 2019-07-29 NOTE — HISTORY OF PRESENT ILLNESS
[FreeTextEntry1] : Patient presents for followup on fatty liver/T2DM/obesity. Patient is trying to improve her fatty liver/T2DM with lifestyle changes as well as her obesity.\par -Patient continues on no daily medications\par -Patient was given metformin for type 2 diabetes and Crestor for hyperlipidemia which she elected not to take

## 2019-08-12 ENCOUNTER — APPOINTMENT (OUTPATIENT)
Dept: GASTROENTEROLOGY | Facility: CLINIC | Age: 49
End: 2019-08-12
Payer: COMMERCIAL

## 2019-08-12 ENCOUNTER — APPOINTMENT (OUTPATIENT)
Dept: SURGERY | Facility: CLINIC | Age: 49
End: 2019-08-12
Payer: COMMERCIAL

## 2019-08-12 VITALS
RESPIRATION RATE: 14 BRPM | SYSTOLIC BLOOD PRESSURE: 142 MMHG | DIASTOLIC BLOOD PRESSURE: 94 MMHG | HEART RATE: 70 BPM | OXYGEN SATURATION: 99 % | WEIGHT: 255 LBS | HEIGHT: 70 IN | BODY MASS INDEX: 36.51 KG/M2

## 2019-08-12 VITALS
DIASTOLIC BLOOD PRESSURE: 82 MMHG | OXYGEN SATURATION: 99 % | SYSTOLIC BLOOD PRESSURE: 118 MMHG | BODY MASS INDEX: 36.94 KG/M2 | HEART RATE: 78 BPM | TEMPERATURE: 98.5 F | HEIGHT: 70 IN | WEIGHT: 258 LBS | RESPIRATION RATE: 16 BRPM

## 2019-08-12 DIAGNOSIS — K43.9 VENTRAL HERNIA W/OUT OBSTRUCTION OR GANGRENE: ICD-10-CM

## 2019-08-12 DIAGNOSIS — K43.2 INCISIONAL HERNIA W/OUT OBSTRUCTION OR GANGRENE: ICD-10-CM

## 2019-08-12 PROCEDURE — 82272 OCCULT BLD FECES 1-3 TESTS: CPT

## 2019-08-12 PROCEDURE — 99214 OFFICE O/P EST MOD 30 MIN: CPT

## 2019-08-12 PROCEDURE — 99202 OFFICE O/P NEW SF 15 MIN: CPT

## 2019-08-12 NOTE — DATA REVIEWED
[FreeTextEntry1] : Independent review of the abd/pel CT scan reveals a fat containing ventral hernia 5 cm superior the umbilicus where there is a fat containing umbilical incisional hernia, there is no bowel obstruction or bowel thickening.

## 2019-08-12 NOTE — HISTORY OF PRESENT ILLNESS
[de-identified] : The patient returns to the office with no new complaints.  She states that the bulge has remained stable in size and is without pain.  She reports that she has not been able to lose any weight at this point as of yet.  She continues to go to the gym to afford weight loss. She has no nausea, no vomit, and no changes in her bowel function.

## 2019-08-12 NOTE — ASSESSMENT
[FreeTextEntry1] : The patient is an obese 49 year old female with a ventral hernia and umbilical incisional hernia. The patient has been advised that weight loss will be an important adjunct to help potentially reduce the risks of infection, hernia recurrence, and chronic post operative pain associated with surgery by potentially reducing the tension along the abdominal wall.  The risks, benefits, and alternatives including the option of doing nothing to a robotic, possible laparoscopic, and possible open incisional hernia repair with  mesh were discussed.  The potential complications including but not limited to infection, bleeding, hernia recurrence, chronic post-operative pain, and seroma formation were discussed.  The patient was educated regarding the signs and symptoms of hernia strangulation and advised to seek immediate MD evaluation should this occur.  The patient understands and will continue with her weight loss attempts. She will follow up in 3 months or sooner should any problems or issues arise. \par

## 2019-08-12 NOTE — PHYSICAL EXAM
[General Appearance - In No Acute Distress] : in no acute distress [General Appearance - Alert] : alert [Sclera] : the sclera and conjunctiva were normal [PERRL With Normal Accommodation] : pupils were equal in size, round, and reactive to light [Oropharynx] : the oropharynx was normal [Outer Ear] : the ears and nose were normal in appearance [Extraocular Movements] : extraocular movements were intact [Neck Appearance] : the appearance of the neck was normal [Neck Cervical Mass (___cm)] : no neck mass was observed [Jugular Venous Distention Increased] : there was no jugular-venous distention [Thyroid Diffuse Enlargement] : the thyroid was not enlarged [Thyroid Nodule] : there were no palpable thyroid nodules [Auscultation Breath Sounds / Voice Sounds] : lungs were clear to auscultation bilaterally [Heart Rate And Rhythm] : heart rate was normal and rhythm regular [Heart Sounds] : normal S1 and S2 [Murmurs] : no murmurs [Heart Sounds Gallop] : no gallops [Heart Sounds Pericardial Friction Rub] : no pericardial rub [Bowel Sounds] : normal bowel sounds [Abdomen Soft] : soft [Abdomen Tenderness] : non-tender [Abnormal Walk] : normal gait [Abdomen Mass (___ Cm)] : no abdominal mass palpated [Musculoskeletal - Swelling] : no joint swelling seen [Nail Clubbing] : no clubbing  or cyanosis of the fingernails [Motor Tone] : muscle strength and tone were normal [Skin Color & Pigmentation] : normal skin color and pigmentation [Deep Tendon Reflexes (DTR)] : deep tendon reflexes were 2+ and symmetric [Skin Turgor] : normal skin turgor [] : no rash [Sensation] : the sensory exam was normal to light touch and pinprick [No Focal Deficits] : no focal deficits [Oriented To Time, Place, And Person] : oriented to person, place, and time [Impaired Insight] : insight and judgment were intact [Affect] : the affect was normal [Normal Sphincter Tone] : normal sphincter tone [No Rectal Mass] : no rectal mass [Internal Hemorrhoid] : no internal hemorrhoids [External Hemorrhoid] : no external hemorrhoids [Occult Blood Positive] : stool was negative for occult blood

## 2019-08-12 NOTE — ASSESSMENT
[FreeTextEntry1] : The patient presents today for colon cancer screening. She is of average risk for developing colorectal neoplasm and will be scheduled for a colonoscopy with MiraLAX prep. I have discussed the indications (including but not limited to ruling out inflammatory bowel disease, colorectal neoplasm, GI bleed, and AVM's), benefits, risks  (including but not limited to reaction to the anesthesia, infection, bleeding, and perforation),  and alternatives to colonoscopy with the patient. The patient understands all options and has agreed to have a colonoscopy and is medically optimized for the planned procedure.\par    I . Dr Tanika Covarrubias, was present for the history and performed the rectal exam myself

## 2019-08-12 NOTE — PHYSICAL EXAM
[JVD] : no jugular venous distention  [No Rash or Lesion] : No rash or lesion [Purpura] : no purpura  [Petechiae] : no petechiae [Skin Ulcer] : no ulcer [Skin Induration] : no induration [Alert] : alert [Oriented to Place] : oriented to place [Oriented to Person] : oriented to person [Calm] : calm [Oriented to Time] : oriented to time [de-identified] : non toxic, in no acute distress  [de-identified] : NC/AT PERRL EOMI no scleral icterus  [de-identified] : trachea midline, no gross mass  [de-identified] : no audible wheezing or stridor  [de-identified] : obese soft, no localizing tenderness, no guarding, no rebound, no masses  [de-identified] : FROM of all extremities with no gross deformity or angulation there is no lymphadenopathy, no angulation, there is bulge of the upper abdomen that appears to be consistent with diastasis rectus, the fascial defect of the visulaized ventral hernia remains limited to examination by the patient's body habitus  [de-identified] : mood is calm

## 2019-08-14 ENCOUNTER — APPOINTMENT (OUTPATIENT)
Dept: CHRONIC DISEASE MANAGEMENT | Facility: CLINIC | Age: 49
End: 2019-08-14
Payer: COMMERCIAL

## 2019-08-14 VITALS — WEIGHT: 253 LBS | HEIGHT: 70 IN | BODY MASS INDEX: 36.22 KG/M2

## 2019-08-14 PROCEDURE — G0108 DIAB MANAGE TRN  PER INDIV: CPT

## 2019-08-19 ENCOUNTER — RX RENEWAL (OUTPATIENT)
Age: 49
End: 2019-08-19

## 2019-09-03 ENCOUNTER — RESULT REVIEW (OUTPATIENT)
Age: 49
End: 2019-09-03

## 2019-09-11 ENCOUNTER — APPOINTMENT (OUTPATIENT)
Dept: CHRONIC DISEASE MANAGEMENT | Facility: CLINIC | Age: 49
End: 2019-09-11

## 2019-10-16 ENCOUNTER — APPOINTMENT (OUTPATIENT)
Dept: INTERNAL MEDICINE | Facility: CLINIC | Age: 49
End: 2019-10-16
Payer: COMMERCIAL

## 2019-10-16 VITALS
WEIGHT: 260 LBS | HEART RATE: 66 BPM | DIASTOLIC BLOOD PRESSURE: 85 MMHG | BODY MASS INDEX: 37.31 KG/M2 | SYSTOLIC BLOOD PRESSURE: 125 MMHG

## 2019-10-16 LAB
ALBUMIN SERPL ELPH-MCNC: 4.2 G/DL
ALP BLD-CCNC: 95 U/L
ALT SERPL-CCNC: 17 U/L
ANION GAP SERPL CALC-SCNC: 12 MMOL/L
AST SERPL-CCNC: 17 U/L
BASOPHILS # BLD AUTO: 0.01 K/UL
BASOPHILS NFR BLD AUTO: 0.2 %
BILIRUB SERPL-MCNC: 0.4 MG/DL
BUN SERPL-MCNC: 10 MG/DL
CALCIUM SERPL-MCNC: 9.5 MG/DL
CHLORIDE SERPL-SCNC: 101 MMOL/L
CHOLEST SERPL-MCNC: 204 MG/DL
CHOLEST/HDLC SERPL: 4.7 RATIO
CO2 SERPL-SCNC: 25 MMOL/L
CREAT SERPL-MCNC: 0.88 MG/DL
EOSINOPHIL # BLD AUTO: 0.13 K/UL
EOSINOPHIL NFR BLD AUTO: 2.4 %
ESTIMATED AVERAGE GLUCOSE: 160 MG/DL
GLUCOSE SERPL-MCNC: 136 MG/DL
HBA1C MFR BLD HPLC: 7.2 %
HCT VFR BLD CALC: 42.8 %
HDLC SERPL-MCNC: 43 MG/DL
HGB BLD-MCNC: 13.7 G/DL
IMM GRANULOCYTES NFR BLD AUTO: 0.4 %
LDLC SERPL CALC-MCNC: 138 MG/DL
LYMPHOCYTES # BLD AUTO: 2.63 K/UL
LYMPHOCYTES NFR BLD AUTO: 47.7 %
MAN DIFF?: NORMAL
MCHC RBC-ENTMCNC: 28.5 PG
MCHC RBC-ENTMCNC: 32 GM/DL
MCV RBC AUTO: 89 FL
MONOCYTES # BLD AUTO: 0.25 K/UL
MONOCYTES NFR BLD AUTO: 4.5 %
NEUTROPHILS # BLD AUTO: 2.47 K/UL
NEUTROPHILS NFR BLD AUTO: 44.8 %
PLATELET # BLD AUTO: 311 K/UL
POTASSIUM SERPL-SCNC: 4.4 MMOL/L
PROT SERPL-MCNC: 7.2 G/DL
RBC # BLD: 4.81 M/UL
RBC # FLD: 12.9 %
SODIUM SERPL-SCNC: 138 MMOL/L
TRIGL SERPL-MCNC: 115 MG/DL
TSH SERPL-ACNC: 1.2 UIU/ML
WBC # FLD AUTO: 5.51 K/UL

## 2019-10-16 PROCEDURE — 99213 OFFICE O/P EST LOW 20 MIN: CPT | Mod: 25

## 2019-10-16 PROCEDURE — 36415 COLL VENOUS BLD VENIPUNCTURE: CPT

## 2019-10-16 NOTE — ADDENDUM
[FreeTextEntry1] : Labs show\par -Diabetes and cholesterol again not controlled therefore again recommend medication

## 2019-10-16 NOTE — ASSESSMENT
[FreeTextEntry1] : Labs sent out and further recommendations will be made based on lab results.Patient continues to need lifestyle changes with weight loss. Patient will return to office in 3-4 months\par \par \par echo was 8/2019=referred to cardio (info given)\par Thyroid sonogram=5/2019=next in 6months-rx given\par Mammogram was 5/2019\par specialists include\par 1. GYN=Dr. Storey\par 2. Endocrinology-Dr. Leahy-prn-to go if thyroid sono changes\par 3.hematology-Dr. Junior-declines follow up\par 4. Orthopedic-Dr. Starkey-no need for follow up"knee is fine now"\par 5.ophthalmology-Dr. Roberts-11/2018\par 6.Surgeon-Dr. Esquivel\par declines flu vaccine,TDAP is UTD/discussed pneumococcal vaccine\par colonoscopy-has sched for 10/17/19\par Microalbumin 7/2019

## 2019-10-17 ENCOUNTER — APPOINTMENT (OUTPATIENT)
Dept: GASTROENTEROLOGY | Facility: GI CENTER | Age: 49
End: 2019-10-17
Payer: COMMERCIAL

## 2019-10-17 ENCOUNTER — OUTPATIENT (OUTPATIENT)
Dept: OUTPATIENT SERVICES | Facility: HOSPITAL | Age: 49
LOS: 1 days | End: 2019-10-17
Payer: COMMERCIAL

## 2019-10-17 DIAGNOSIS — N84.0 POLYP OF CORPUS UTERI: Chronic | ICD-10-CM

## 2019-10-17 DIAGNOSIS — Z12.11 ENCOUNTER FOR SCREENING FOR MALIGNANT NEOPLASM OF COLON: ICD-10-CM

## 2019-10-17 PROCEDURE — 45378 DIAGNOSTIC COLONOSCOPY: CPT

## 2019-10-17 PROCEDURE — G0121: CPT

## 2019-10-17 NOTE — PROCEDURE
[Colon Cancer Screening] : colon cancer screening [Procedure Explained] : The procedure was explained [Allergies Reviewed] : allergies reviewed. [Risks] : Risks [Alternatives] : alternatives [Benefits] : benefits [Bleeding] : bleeding risk [Infection] : risk of infection [Consent Obtained] : written consent was obtained prior to the procedure and is detailed in the patient's record [Patient] : the patient [Bowel Prep Kit] : the patient took the appropriate bowel preparation kit as directed [Approved Diet Followed] : the patient avoided solid foods and adhered to the approved diet list for 24 hours prior to the procedure [Cardiac Monitor] : cardiac monitor [Automated Blood Pressure Cuff] : automated blood pressure cuff [Pulse Oximeter] : pulse oximeter [2] : 2 [Sedation Clearance] : the patient was cleared for moderate sedation [Prep Qualtiy: ___] : Prep Quality:  [unfilled] [Performed By: ___] : Performed by:  MIRNA [Withdrawal Time: ___] : Withdrawal Time:  [unfilled] [Left Lateral Decubitus] : The patient was positioned in the left lateral decubitus position [Abnormal Rectum] : a normal rectum [External Hemorrhoids] : no external hemorrhoids [Insufflated] : insufflated [Cecum (Landmarks/Transillum)] : and guided to the cecum which was identified by the anatomic landmarks of the appendiceal orifice and ileocecal valve and by transillumination in the right lower quadrant [Single Pass Needed] : after a single pass [Retroflex View] : a retroflex view of the rectum was performed [Patient Rotated Into Alternating Positions] : the patient was not rotated [Normal] : Normal [Vital Signs Stable] : the vital signs were stable [Tolerated Well] : the patient tolerated the procedure well [No Complications] : There were no complications [de-identified] : [id number]

## 2019-10-17 NOTE — PHYSICAL EXAM
[General Appearance - Alert] : alert [General Appearance - In No Acute Distress] : in no acute distress [General Appearance - Well Developed] : well developed [General Appearance - Well Nourished] : well nourished [Sclera] : the sclera and conjunctiva were normal [Outer Ear] : the ears and nose were normal in appearance [Neck Appearance] : the appearance of the neck was normal [Neck Cervical Mass (___cm)] : no neck mass was observed [] : no respiratory distress [Exaggerated Use Of Accessory Muscles For Inspiration] : no accessory muscle use [Auscultation Breath Sounds / Voice Sounds] : lungs were clear to auscultation bilaterally [Apical Impulse] : the apical impulse was normal [Respiration, Rhythm And Depth] : normal respiratory rhythm and effort [Heart Sounds] : normal S1 and S2 [Heart Rate And Rhythm] : heart rate was normal and rhythm regular [Bowel Sounds] : normal bowel sounds [Impaired Insight] : insight and judgment were intact [Abdomen Soft] : soft [Abdomen Tenderness] : non-tender [Oriented To Time, Place, And Person] : oriented to person, place, and time [Affect] : the affect was normal

## 2019-10-17 NOTE — PROCEDURE
[Colon Cancer Screening] : colon cancer screening [Procedure Explained] : The procedure was explained [Allergies Reviewed] : allergies reviewed. [Risks] : Risks [Benefits] : benefits [Alternatives] : alternatives [Infection] : risk of infection [Bleeding] : bleeding risk [Consent Obtained] : written consent was obtained prior to the procedure and is detailed in the patient's record [Bowel Prep Kit] : the patient took the appropriate bowel preparation kit as directed [Patient] : the patient [Approved Diet Followed] : the patient avoided solid foods and adhered to the approved diet list for 24 hours prior to the procedure [Pulse Oximeter] : pulse oximeter [Cardiac Monitor] : cardiac monitor [Automated Blood Pressure Cuff] : automated blood pressure cuff [2] : 2 [Sedation Clearance] : the patient was cleared for moderate sedation [Prep Qualtiy: ___] : Prep Quality:  [unfilled] [Withdrawal Time: ___] : Withdrawal Time:  [unfilled] [Performed By: ___] : Performed by:  MIRNA [Left Lateral Decubitus] : The patient was positioned in the left lateral decubitus position [External Hemorrhoids] : no external hemorrhoids [Abnormal Rectum] : a normal rectum [Cecum (Landmarks/Transillum)] : and guided to the cecum which was identified by the anatomic landmarks of the appendiceal orifice and ileocecal valve and by transillumination in the right lower quadrant [Single Pass Needed] : after a single pass [Insufflated] : insufflated [Retroflex View] : a retroflex view of the rectum was performed [Patient Rotated Into Alternating Positions] : the patient was not rotated [Normal] : Normal [Tolerated Well] : the patient tolerated the procedure well [Vital Signs Stable] : the vital signs were stable [No Complications] : There were no complications [de-identified] : [id number]

## 2019-10-17 NOTE — PHYSICAL EXAM
[General Appearance - Alert] : alert [General Appearance - Well Developed] : well developed [General Appearance - Well Nourished] : well nourished [General Appearance - In No Acute Distress] : in no acute distress [Outer Ear] : the ears and nose were normal in appearance [Neck Appearance] : the appearance of the neck was normal [Sclera] : the sclera and conjunctiva were normal [Neck Cervical Mass (___cm)] : no neck mass was observed [] : no respiratory distress [Respiration, Rhythm And Depth] : normal respiratory rhythm and effort [Auscultation Breath Sounds / Voice Sounds] : lungs were clear to auscultation bilaterally [Apical Impulse] : the apical impulse was normal [Exaggerated Use Of Accessory Muscles For Inspiration] : no accessory muscle use [Heart Rate And Rhythm] : heart rate was normal and rhythm regular [Bowel Sounds] : normal bowel sounds [Heart Sounds] : normal S1 and S2 [Abdomen Soft] : soft [Impaired Insight] : insight and judgment were intact [Abdomen Tenderness] : non-tender [Oriented To Time, Place, And Person] : oriented to person, place, and time [Affect] : the affect was normal

## 2019-10-18 ENCOUNTER — NON-APPOINTMENT (OUTPATIENT)
Age: 49
End: 2019-10-18

## 2019-10-18 ENCOUNTER — APPOINTMENT (OUTPATIENT)
Dept: CARDIOLOGY | Facility: CLINIC | Age: 49
End: 2019-10-18
Payer: COMMERCIAL

## 2019-10-18 VITALS — SYSTOLIC BLOOD PRESSURE: 122 MMHG | DIASTOLIC BLOOD PRESSURE: 79 MMHG

## 2019-10-18 VITALS
WEIGHT: 253 LBS | BODY MASS INDEX: 36.22 KG/M2 | HEIGHT: 70 IN | SYSTOLIC BLOOD PRESSURE: 123 MMHG | HEART RATE: 79 BPM | OXYGEN SATURATION: 99 % | DIASTOLIC BLOOD PRESSURE: 80 MMHG

## 2019-10-18 DIAGNOSIS — S83.242A OTHER TEAR OF MEDIAL MENISCUS, CURRENT INJURY, LEFT KNEE, INITIAL ENCOUNTER: ICD-10-CM

## 2019-10-18 PROCEDURE — 99214 OFFICE O/P EST MOD 30 MIN: CPT | Mod: 25

## 2019-10-18 PROCEDURE — 99204 OFFICE O/P NEW MOD 45 MIN: CPT | Mod: 25

## 2019-10-18 PROCEDURE — 93000 ELECTROCARDIOGRAM COMPLETE: CPT

## 2019-10-18 NOTE — HISTORY OF PRESENT ILLNESS
[FreeTextEntry1] : Patient is a 49-year-old  female who presents for cardiac consultation because of a recent abnormal echocardiogram and complaints of dyspnea.Patient has a history of hyperlipidemia. Patient has noted exertional dyspnea when she works out in the gym and she attributes that to her weight. Patient denies any chest tightness, palpitations or syncope.\par \par Patient has no history of diabetes mellitus, she is a nonsmoker.

## 2019-10-18 NOTE — ASSESSMENT
[FreeTextEntry1] : EKG- NSR. Normal\par \par Echo 8/2019- LVEF 55-60%, Mild MR and Mild to Mod TR\par \par Assessment:\par 1. Exertional Dyspnea\par 2. Mild MR/Mild to Mod TR\par \par Recommendations:\par 1. Regular Stress Test\par 2. F/U 1 year as long as stress test is normal

## 2019-10-18 NOTE — PHYSICAL EXAM
[General Appearance - Well Nourished] : well nourished [General Appearance - Well Developed] : well developed [Normal Oral Mucosa] : normal oral mucosa [Normal Conjunctiva] : the conjunctiva exhibited no abnormalities [FreeTextEntry1] : No JVD [Heart Sounds] : normal S1 and S2 [Auscultation Breath Sounds / Voice Sounds] : lungs were clear to auscultation bilaterally [Heart Rate And Rhythm] : heart rate and rhythm were normal [Abdomen Soft] : soft [Bowel Sounds] : normal bowel sounds [Abnormal Walk] : normal gait [Cyanosis, Localized] : no localized cyanosis [] : no rash

## 2019-10-30 ENCOUNTER — RESULT REVIEW (OUTPATIENT)
Age: 49
End: 2019-10-30

## 2019-11-18 ENCOUNTER — APPOINTMENT (OUTPATIENT)
Dept: SURGERY | Facility: CLINIC | Age: 49
End: 2019-11-18

## 2019-11-29 ENCOUNTER — MOBILE ON CALL (OUTPATIENT)
Age: 49
End: 2019-11-29

## 2019-12-02 ENCOUNTER — APPOINTMENT (OUTPATIENT)
Dept: CARDIOLOGY | Facility: CLINIC | Age: 49
End: 2019-12-02
Payer: COMMERCIAL

## 2019-12-02 PROCEDURE — 93015 CV STRESS TEST SUPVJ I&R: CPT

## 2020-01-16 ENCOUNTER — APPOINTMENT (OUTPATIENT)
Dept: INTERNAL MEDICINE | Facility: CLINIC | Age: 50
End: 2020-01-16
Payer: COMMERCIAL

## 2020-01-16 VITALS
DIASTOLIC BLOOD PRESSURE: 80 MMHG | WEIGHT: 257 LBS | HEIGHT: 70 IN | HEART RATE: 77 BPM | BODY MASS INDEX: 36.79 KG/M2 | OXYGEN SATURATION: 98 % | SYSTOLIC BLOOD PRESSURE: 130 MMHG

## 2020-01-16 PROCEDURE — 36415 COLL VENOUS BLD VENIPUNCTURE: CPT

## 2020-01-16 PROCEDURE — 99213 OFFICE O/P EST LOW 20 MIN: CPT | Mod: 25

## 2020-01-16 NOTE — PHYSICAL EXAM
[General Appearance - In No Acute Distress] : in no acute distress [Respiration, Rhythm And Depth] : normal respiratory rhythm and effort [] : no respiratory distress [Auscultation Breath Sounds / Voice Sounds] : lungs were clear to auscultation bilaterally [Affect] : the affect was normal [Heart Rate And Rhythm] : heart rate was normal and rhythm regular [Mood] : the mood was normal

## 2020-01-20 LAB
BASOPHILS # BLD AUTO: 0.01 K/UL
BASOPHILS NFR BLD AUTO: 0.2 %
EOSINOPHIL # BLD AUTO: 0.19 K/UL
EOSINOPHIL NFR BLD AUTO: 3.4 %
ESTIMATED AVERAGE GLUCOSE: 169 MG/DL
HBA1C MFR BLD HPLC: 7.5 %
HCT VFR BLD CALC: 42.9 %
HGB BLD-MCNC: 13.6 G/DL
IMM GRANULOCYTES NFR BLD AUTO: 0 %
LYMPHOCYTES # BLD AUTO: 2.94 K/UL
LYMPHOCYTES NFR BLD AUTO: 52.5 %
MAN DIFF?: NORMAL
MCHC RBC-ENTMCNC: 28.4 PG
MCHC RBC-ENTMCNC: 31.7 GM/DL
MCV RBC AUTO: 89.6 FL
MONOCYTES # BLD AUTO: 0.32 K/UL
MONOCYTES NFR BLD AUTO: 5.7 %
NEUTROPHILS # BLD AUTO: 2.14 K/UL
NEUTROPHILS NFR BLD AUTO: 38.2 %
PLATELET # BLD AUTO: 319 K/UL
RBC # BLD: 4.79 M/UL
RBC # FLD: 12.4 %
TSH SERPL-ACNC: 1.26 UIU/ML
WBC # FLD AUTO: 5.6 K/UL

## 2020-01-21 LAB
ALBUMIN SERPL ELPH-MCNC: 4.1 G/DL
ALP BLD-CCNC: 88 U/L
ALT SERPL-CCNC: 21 U/L
ANION GAP SERPL CALC-SCNC: 17 MMOL/L
AST SERPL-CCNC: 21 U/L
BILIRUB SERPL-MCNC: 0.2 MG/DL
BUN SERPL-MCNC: 10 MG/DL
CALCIUM SERPL-MCNC: 9.6 MG/DL
CHLORIDE SERPL-SCNC: 101 MMOL/L
CHOLEST SERPL-MCNC: 130 MG/DL
CHOLEST/HDLC SERPL: 3.3 RATIO
CO2 SERPL-SCNC: 23 MMOL/L
CREAT SERPL-MCNC: 0.93 MG/DL
GLUCOSE SERPL-MCNC: 122 MG/DL
HDLC SERPL-MCNC: 39 MG/DL
LDLC SERPL CALC-MCNC: 68 MG/DL
POTASSIUM SERPL-SCNC: 4.6 MMOL/L
PROT SERPL-MCNC: 6.6 G/DL
SODIUM SERPL-SCNC: 141 MMOL/L
TRIGL SERPL-MCNC: 115 MG/DL

## 2020-01-21 NOTE — ASSESSMENT
[FreeTextEntry1] : Venipuncture done in our office, Labs sent out and further recommendations will be made based on lab results.Patient continues to need lifestyle changes with weight loss. Patient will return to office in 3-4 months\par \par \par echo was 8/2019\par Thyroid sonogram=5/2019=next in 6months-"to do, has rx"\par Mammogram was 5/2019\par specialists include\par 1. GYN=Dr. Storey\par 2. Endocrinology-Dr. Leahy-prn-to go if thyroid sono changes\par 3.hematology-Dr. Junior-declines follow up\par 4. Orthopedic-Dr. Starkey-no need for follow up"knee is fine now"\par 5.ophthalmology-Dr. Roberts-11/2018-to sched followup\par 6.Surgeon-Dr. Esquivel\par 7.Cardio-Dr. Mancia\par declines flu vaccine,TDAP is UTD/discussed pneumococcal vaccine\par colonoscopy-10/2019-next in 10years\par stress test 12/2019\par Microalbumin 7/2019

## 2020-01-21 NOTE — ADDENDUM
[FreeTextEntry1] : CMP/lipid unable to be done per lab, patient to follow up to redraw\par \par Lab was able to run test so no need for redraw

## 2020-04-08 ENCOUNTER — APPOINTMENT (OUTPATIENT)
Dept: INTERNAL MEDICINE | Facility: CLINIC | Age: 50
End: 2020-04-08
Payer: COMMERCIAL

## 2020-04-08 PROCEDURE — G2012 BRIEF CHECK IN BY MD/QHP: CPT

## 2020-04-08 NOTE — HISTORY OF PRESENT ILLNESS
[Verbal consent obtained from patient] : the patient, [unfilled] [Time Spent: ___ minutes] : I have spent [unfilled] minutes with the patient on the telephone [FreeTextEntry1] : Verbal Consent obtained from patient for telemedicine encounter\par \par \par Pt called on 4/8/20 c/o cough that she thinks is from allergies/PND, on zyrtec, would like cough med to use QHS. "cough is in throat" "does not feel sick"\par Pt denies SOB/dyspnea/fever/Travel/exposure to know corona virus\par \par Diagnosis is based on symptoms only as physical exam can not be done as pt advised not to be seen in office with corona virus pandemic\par Pt verbalized understanding of plan\par \par Pt will do to clinic/ ED if needed for worsened symptoms\par To call with progress\par \par Total time on phone=5minutes\par DX CODE=R05\par

## 2020-04-08 NOTE — PLAN
[FreeTextEntry1] : Pt treated with Astelin NS/Hycodan given per pt req/to cont zyrtec. To call if symptoms persist or worsen.

## 2020-04-20 ENCOUNTER — APPOINTMENT (OUTPATIENT)
Dept: INTERNAL MEDICINE | Facility: CLINIC | Age: 50
End: 2020-04-20
Payer: COMMERCIAL

## 2020-04-20 VITALS
HEART RATE: 70 BPM | WEIGHT: 249 LBS | BODY MASS INDEX: 35.65 KG/M2 | HEIGHT: 70 IN | SYSTOLIC BLOOD PRESSURE: 124 MMHG | DIASTOLIC BLOOD PRESSURE: 80 MMHG

## 2020-04-20 DIAGNOSIS — Z86.39 PERSONAL HISTORY OF OTHER ENDOCRINE, NUTRITIONAL AND METABOLIC DISEASE: ICD-10-CM

## 2020-04-20 DIAGNOSIS — Z87.09 PERSONAL HISTORY OF OTHER DISEASES OF THE RESPIRATORY SYSTEM: ICD-10-CM

## 2020-04-20 DIAGNOSIS — Z12.11 ENCOUNTER FOR SCREENING FOR MALIGNANT NEOPLASM OF COLON: ICD-10-CM

## 2020-04-20 PROCEDURE — 36415 COLL VENOUS BLD VENIPUNCTURE: CPT

## 2020-04-20 PROCEDURE — 99213 OFFICE O/P EST LOW 20 MIN: CPT | Mod: 25

## 2020-04-20 RX ORDER — AMOXICILLIN 875 MG/1
875 TABLET, FILM COATED ORAL
Qty: 14 | Refills: 0 | Status: DISCONTINUED | COMMUNITY
Start: 2020-03-17 | End: 2020-04-20

## 2020-04-20 RX ORDER — HYDROCODONE BITARTRATE AND HOMATROPINE METHYLBROMIDE 5; 1.5 MG/5ML; MG/5ML
5-1.5 SYRUP ORAL EVERY 6 HOURS
Qty: 120 | Refills: 0 | Status: DISCONTINUED | COMMUNITY
Start: 2020-04-08 | End: 2020-04-20

## 2020-04-20 NOTE — PHYSICAL EXAM
[] : no respiratory distress [General Appearance - In No Acute Distress] : in no acute distress [Respiration, Rhythm And Depth] : normal respiratory rhythm and effort [Auscultation Breath Sounds / Voice Sounds] : lungs were clear to auscultation bilaterally [Heart Rate And Rhythm] : heart rate was normal and rhythm regular [Affect] : the affect was normal [Mood] : the mood was normal

## 2020-04-21 LAB
ALBUMIN SERPL ELPH-MCNC: 4.1 G/DL
ALP BLD-CCNC: 89 U/L
ALT SERPL-CCNC: 20 U/L
ANION GAP SERPL CALC-SCNC: 12 MMOL/L
AST SERPL-CCNC: 15 U/L
BASOPHILS # BLD AUTO: 0.01 K/UL
BASOPHILS NFR BLD AUTO: 0.2 %
BILIRUB SERPL-MCNC: 0.2 MG/DL
BUN SERPL-MCNC: 14 MG/DL
CALCIUM SERPL-MCNC: 9.1 MG/DL
CHLORIDE SERPL-SCNC: 102 MMOL/L
CHOLEST SERPL-MCNC: 131 MG/DL
CHOLEST/HDLC SERPL: 3.5 RATIO
CO2 SERPL-SCNC: 24 MMOL/L
CREAT SERPL-MCNC: 0.86 MG/DL
EOSINOPHIL # BLD AUTO: 0.18 K/UL
EOSINOPHIL NFR BLD AUTO: 3.2 %
ESTIMATED AVERAGE GLUCOSE: 197 MG/DL
GLUCOSE SERPL-MCNC: 182 MG/DL
HBA1C MFR BLD HPLC: 8.5 %
HCT VFR BLD CALC: 44.2 %
HDLC SERPL-MCNC: 37 MG/DL
HGB BLD-MCNC: 13.6 G/DL
IMM GRANULOCYTES NFR BLD AUTO: 0.2 %
LDLC SERPL CALC-MCNC: 69 MG/DL
LYMPHOCYTES # BLD AUTO: 2.86 K/UL
LYMPHOCYTES NFR BLD AUTO: 51.6 %
MAN DIFF?: NORMAL
MCHC RBC-ENTMCNC: 27.9 PG
MCHC RBC-ENTMCNC: 30.8 GM/DL
MCV RBC AUTO: 90.8 FL
MONOCYTES # BLD AUTO: 0.29 K/UL
MONOCYTES NFR BLD AUTO: 5.2 %
NEUTROPHILS # BLD AUTO: 2.19 K/UL
NEUTROPHILS NFR BLD AUTO: 39.6 %
PLATELET # BLD AUTO: 453 K/UL
POTASSIUM SERPL-SCNC: 4.5 MMOL/L
PROT SERPL-MCNC: 7 G/DL
RBC # BLD: 4.87 M/UL
RBC # FLD: 12.4 %
SODIUM SERPL-SCNC: 139 MMOL/L
TRIGL SERPL-MCNC: 125 MG/DL
TSH SERPL-ACNC: 1.02 UIU/ML
WBC # FLD AUTO: 5.54 K/UL

## 2020-04-21 NOTE — ASSESSMENT
[FreeTextEntry1] : Venipuncture done in our office, Labs sent out and further recommendations will be made based on lab results.Patient continues to need lifestyle changes with weight loss. Patient will return to office in 3-4 months for CP\par \par \par echo was 8/2019\par Thyroid sonogram=5/2019=next in 6months-"to do"=referral again given\par Mammogram was 5/2019=referral given\par specialists include\par 1. GYN=Dr. Storey\par 2. Endocrinology-Dr. Leahy-prn-to go if thyroid sono changes\par 3.hematology-Dr. Junior-declines follow up\par 4. Orthopedic-Dr. Starkey-no need for follow up"knee is fine now"\par 5.ophthalmology-Dr. Roberts-11/2018-to sched followup "to do"=referral again given\par 6.Surgeon-Dr. Esquivel\par 7.Cardio-Dr. Mancia\par declines flu vaccine,declines pneumococcal vaccine\par colonoscopy-10/2019-next in 10years\par stress test 12/2019\par Microalbumin 7/2019

## 2020-04-21 NOTE — ADDENDUM
[FreeTextEntry1] : Labs show\par -platelets of 453, check next\par -T2DM=UC=increase metformin ER to 750mg 2 tabs QD

## 2020-04-21 NOTE — HISTORY OF PRESENT ILLNESS
[FreeTextEntry1] : Patient presents for followup on fatty liver/T2DM/obesity. Patient is trying to improve her fatty liver/T2DM with lifestyle changes as well as her obesity, is on metformin for type 2 diabetes and Crestor for hyperlipidemia.

## 2020-07-06 ENCOUNTER — APPOINTMENT (OUTPATIENT)
Dept: INTERNAL MEDICINE | Facility: CLINIC | Age: 50
End: 2020-07-06
Payer: COMMERCIAL

## 2020-07-06 VITALS
SYSTOLIC BLOOD PRESSURE: 122 MMHG | TEMPERATURE: 97.7 F | DIASTOLIC BLOOD PRESSURE: 80 MMHG | BODY MASS INDEX: 35.93 KG/M2 | WEIGHT: 251 LBS | RESPIRATION RATE: 16 BRPM | HEIGHT: 70 IN | HEART RATE: 75 BPM

## 2020-07-06 DIAGNOSIS — Z11.59 ENCOUNTER FOR SCREENING FOR OTHER VIRAL DISEASES: ICD-10-CM

## 2020-07-06 PROCEDURE — G0442 ANNUAL ALCOHOL SCREEN 15 MIN: CPT | Mod: NC

## 2020-07-06 PROCEDURE — 99396 PREV VISIT EST AGE 40-64: CPT | Mod: 25

## 2020-07-06 PROCEDURE — 36415 COLL VENOUS BLD VENIPUNCTURE: CPT

## 2020-07-06 NOTE — PHYSICAL EXAM
[General Appearance - Alert] : alert [General Appearance - In No Acute Distress] : in no acute distress [Sclera] : the sclera and conjunctiva were normal [PERRL With Normal Accommodation] : pupils were equal in size, round, and reactive to light [Extraocular Movements] : extraocular movements were intact [Outer Ear] : the ears and nose were normal in appearance [Oropharynx] : the oropharynx was normal [Neck Cervical Mass (___cm)] : no neck mass was observed [Neck Appearance] : the appearance of the neck was normal [Jugular Venous Distention Increased] : there was no jugular-venous distention [Thyroid Diffuse Enlargement] : the thyroid was not enlarged [Heart Rate And Rhythm] : heart rate was normal and rhythm regular [Auscultation Breath Sounds / Voice Sounds] : lungs were clear to auscultation bilaterally [Heart Sounds] : normal S1 and S2 [Heart Sounds Gallop] : no gallops [Murmurs] : no murmurs [Arterial Pulses Carotid] : carotid pulses were normal with no bruits [Heart Sounds Pericardial Friction Rub] : no pericardial rub [Abdominal Aorta] : the abdominal aorta was normal [Edema] : there was no peripheral edema [Full Pulse] : the pedal pulses are present [Bowel Sounds] : normal bowel sounds [Abdomen Soft] : soft [Abdomen Tenderness] : non-tender [Abdomen Mass (___ Cm)] : no abdominal mass palpated [Cervical Lymph Nodes Enlarged Posterior Bilaterally] : posterior cervical [Cervical Lymph Nodes Enlarged Anterior Bilaterally] : anterior cervical [Supraclavicular Lymph Nodes Enlarged Bilaterally] : supraclavicular [Axillary Lymph Nodes Enlarged Bilaterally] : axillary [Inguinal Lymph Nodes Enlarged Bilaterally] : inguinal [Femoral Lymph Nodes Enlarged Bilaterally] : femoral [No Spinal Tenderness] : no spinal tenderness [Abnormal Walk] : normal gait [Nail Clubbing] : no clubbing  or cyanosis of the fingernails [Musculoskeletal - Swelling] : no joint swelling seen [Skin Color & Pigmentation] : normal skin color and pigmentation [Motor Tone] : muscle strength and tone were normal [Skin Turgor] : normal skin turgor [] : no rash [Cranial Nerves] : cranial nerves 2-12 were intact [No Focal Deficits] : no focal deficits [Oriented To Time, Place, And Person] : oriented to person, place, and time [Impaired Insight] : insight and judgment were intact [Affect] : the affect was normal [Right Foot Was Examined] : right foot was examined [Left Foot Was Examined] : left foot was examined [Normal Appearance] : normal in appearance [Normal in Appearance] : normal in appearance [Normal] : normal [2+] : 2+ in the posterior tibialis [#1 Diminished] : number 1 was normal [#3 Diminished] : number 3 was normal [#5 Diminished] : number 5 was normal [#2 Diminished] : number 2 was normal [#4 Diminished] : number 4 was normal [#6 Diminished] : number 6 was normal [#8 Diminished] : number 8 was normal [#7 Diminished] : number 7 was normal [#10 Diminished] : number 10 was normal [#9 Diminished] : number 9 was normal [Over the Past 2 Weeks, Have You Felt Little Interest or Pleasure Doing Things?] : 2.) Over the past 2 weeks, have you felt little interest or pleasure doing things? No [FreeTextEntry1] : Umbilical keloids [Over the Past 2 Weeks, Have You Felt Down, Depressed, or Hopeless?] : 1.) Over the past 2 weeks, have you felt down, depressed, or hopeless? No

## 2020-07-06 NOTE — COUNSELING
[Weight management counseling provided] : Weight management [Healthy eating counseling provided] : healthy eating [Activity counseling provided] : activity [Low Fat Diet] : Low fat diet [Decrease Portions] : Decrease food portions [None] : None [Needs reinforcement, provided] : Patient needs reinforcement on understanding lifestyle changes and  the steps needed to achieve self management goals and reinforcement was provided [Benefits of weight loss discussed] : Benefits of weight loss discussed [Potential consequences of obesity discussed] : Potential consequences of obesity discussed [Structured Weight Management Program suggested:] : Structured weight management program suggested [Encouraged to maintain food diary] : Encouraged to maintain food diary [FreeTextEntry1] : Weight Watchers [Encouraged to increase physical activity] : Encouraged to increase physical activity [Target Wt Loss Goal ___] : Weight Loss Goals: Target weight loss goal [unfilled] lbs

## 2020-07-06 NOTE — ASSESSMENT
[FreeTextEntry1] : 50-year-old female with type 2 diabetes diagnosed May 2019 continues to need lifestyle changes with diet and exercise.\par Continue metformin as prescribed \par Patient blood pressure is good but discussed renal protection using an ACEI/ARB. Patient agrees therefore start ramipril 2.5 mg daily.\par \par  Patient again encouraged a low carb diet with regular exercise and resultant weight loss. Goal is for a 10-15 pound weight loss in the next 3 months. Patient states she is going to join [employer].\par \par Thyroid sonogram done May 2019 with recommended followup in 6 months with the patient did not do therefore referral again given\par Echocardiogram September 2019\par Mammography may 2019. Referral again given\par GYN followup scheduled\par Colonoscopy October 2019 with followup in 10 years\par Ophthalmology referral given\par \par Followup in 2-3 weeks with added ramipril to check blood pressure and BMP\par Followup in 3 months\par Venipuncture done today in the office

## 2020-07-06 NOTE — HISTORY OF PRESENT ILLNESS
[FreeTextEntry1] : 50-year-old female in for her yearly physical.\par \par Patient with known history of prediabetes with significant increased risk of diabetes secondary to family history and history of gestational diabetes.Unfortunately patient blood work last year showed her to be type II diabetic and has been on metformin since. Last blood work April 2020 with not good with hemoglobin A1c 8.5 therefore metformin increased to 750 mg 2 tablets daily.\par \par Unfortunately the patient still has not made any lifestyle changes with diet and exercise therefore no weight loss.\par \par she is generally feeling well without chest pain, palpitations, shortness of breath or edema \par \par Patient has significant dysfunctional uterine bleeding and ultimately had a robotic assisted hysterectomy October 2015 with resolution of her anemia.\par \par She has a left thyroid nodule for which she was following with endocrinologist Dr. Leahy.She had an FNA biopsy June of 2017= benign.\par Followup thyroid ultrasound May 2019 with six-month followup recommended which patient did not do\par \par The patient had was a partial torn meniscus of her left knee October 2017.\par For this she saw orthopedic Dr Starkey who referred her for physical therapy which she did not do and her knee has improved.\par \par The patient does have a ventral hernia as well as umbilical hernia for which she has seen surgery who recommended surgery but not until patient loses weight.\par \par Cardiology evaluation 2019 with a negative stress test and echocardiogram Showing mild MR/TR/NM

## 2020-07-06 NOTE — REVIEW OF SYSTEMS
[Feeling Tired] : feeling tired [Recent Weight Gain (___ Lbs)] : recent [unfilled] ~Ulb weight gain [Negative] : Heme/Lymph [Fever] : no fever [Feeling Poorly] : not feeling poorly [Chills] : no chills

## 2020-07-06 NOTE — HEALTH RISK ASSESSMENT
[Fair] : ~his/her~ current health as fair  [Very Good] : ~his/her~  mood as very good [No falls in past year] : Patient reported no falls in the past year [0] : 2) Feeling down, depressed, or hopeless: Not at all (0) [None] : None [With Significant Other] : lives with significant other [With Family] : lives with family [# of Members in Household ___] :  household currently consist of [unfilled] member(s) [Employed] : employed [College] : College [] :  [# Of Children ___] : has [unfilled] children [Sexually Active] : sexually active [Feels Safe at Home] : Feels safe at home [Fully functional (bathing, dressing, toileting, transferring, walking, feeding)] : Fully functional (bathing, dressing, toileting, transferring, walking, feeding) [Fully functional (using the telephone, shopping, preparing meals, housekeeping, doing laundry, using] : Fully functional and needs no help or supervision to perform IADLs (using the telephone, shopping, preparing meals, housekeeping, doing laundry, using transportation, managing medications and managing finances) [Reports changes in vision] : Reports changes in vision [Smoke Detector] : smoke detector [Guns at Home] : guns at home [Carbon Monoxide Detector] : carbon monoxide detector [Seat Belt] :  uses seat belt [Reviewed no changes] : Reviewed no changes [Discussed at today's visit] : Advance Directives Discussed at today's visit [No] : In the past 12 months have you used drugs other than those required for medical reasons? No [Designated Healthcare Proxy] : Designated healthcare proxy [Name: ___] : Health Care Proxy's Name: [unfilled]  [] : No [[id number]] : 0 [de-identified] : no exercise [de-identified] : fair [Change in mental status noted] : No change in mental status noted [Reports changes in dental health] : Reports no changes in dental health [Reports changes in hearing] : Reports no changes in hearing [Sunscreen] : does not use sunscreen [FreeTextEntry2] : Risk mamagement for [employer] [de-identified] : safe and protected [de-identified] : early glaucoma [AdvancecareDate] : 07/20

## 2020-07-07 DIAGNOSIS — Z01.84 ENCOUNTER FOR ANTIBODY RESPONSE EXAMINATION: ICD-10-CM

## 2020-07-07 LAB
ALBUMIN SERPL ELPH-MCNC: 4.8 G/DL
ALP BLD-CCNC: 95 U/L
ALT SERPL-CCNC: 29 U/L
ANION GAP SERPL CALC-SCNC: 11 MMOL/L
APPEARANCE: CLEAR
AST SERPL-CCNC: 26 U/L
BACTERIA: ABNORMAL
BASOPHILS # BLD AUTO: 0 K/UL
BASOPHILS NFR BLD AUTO: 0 %
BILIRUB SERPL-MCNC: 0.5 MG/DL
BILIRUBIN URINE: NEGATIVE
BLOOD URINE: NEGATIVE
BUN SERPL-MCNC: 14 MG/DL
CALCIUM SERPL-MCNC: 9.9 MG/DL
CHLORIDE SERPL-SCNC: 103 MMOL/L
CHOLEST SERPL-MCNC: 143 MG/DL
CHOLEST/HDLC SERPL: 3.2 RATIO
CO2 SERPL-SCNC: 27 MMOL/L
COLOR: NORMAL
CREAT SERPL-MCNC: 0.96 MG/DL
CREAT SPEC-SCNC: 116 MG/DL
EOSINOPHIL # BLD AUTO: 0.17 K/UL
EOSINOPHIL NFR BLD AUTO: 2.6 %
ESTIMATED AVERAGE GLUCOSE: 157 MG/DL
GLUCOSE QUALITATIVE U: NEGATIVE
GLUCOSE SERPL-MCNC: 127 MG/DL
HBA1C MFR BLD HPLC: 7.1 %
HCT VFR BLD CALC: 44.5 %
HDLC SERPL-MCNC: 45 MG/DL
HGB BLD-MCNC: 13.8 G/DL
HYALINE CASTS: 1 /LPF
IMM GRANULOCYTES NFR BLD AUTO: 0.2 %
KETONES URINE: NEGATIVE
LDLC SERPL CALC-MCNC: 79 MG/DL
LEUKOCYTE ESTERASE URINE: NEGATIVE
LYMPHOCYTES # BLD AUTO: 3.39 K/UL
LYMPHOCYTES NFR BLD AUTO: 51.4 %
MAN DIFF?: NORMAL
MCHC RBC-ENTMCNC: 28.8 PG
MCHC RBC-ENTMCNC: 31 GM/DL
MCV RBC AUTO: 92.9 FL
MICROALBUMIN 24H UR DL<=1MG/L-MCNC: 8.2 MG/DL
MICROALBUMIN/CREAT 24H UR-RTO: 71 MG/G
MICROSCOPIC-UA: NORMAL
MONOCYTES # BLD AUTO: 0.37 K/UL
MONOCYTES NFR BLD AUTO: 5.6 %
NEUTROPHILS # BLD AUTO: 2.65 K/UL
NEUTROPHILS NFR BLD AUTO: 40.2 %
NITRITE URINE: NEGATIVE
PH URINE: 5.5
PLATELET # BLD AUTO: 310 K/UL
POTASSIUM SERPL-SCNC: 4.5 MMOL/L
PROT SERPL-MCNC: 7.5 G/DL
PROTEIN URINE: NORMAL
RBC # BLD: 4.79 M/UL
RBC # FLD: 13.3 %
RED BLOOD CELLS URINE: 2 /HPF
SARS-COV-2 IGG SERPL IA-ACNC: 6.65 INDEX
SARS-COV-2 IGG SERPL QL IA: POSITIVE
SODIUM SERPL-SCNC: 142 MMOL/L
SPECIFIC GRAVITY URINE: 1.02
SQUAMOUS EPITHELIAL CELLS: 3 /HPF
TRIGL SERPL-MCNC: 91 MG/DL
UROBILINOGEN URINE: NORMAL
WBC # FLD AUTO: 6.59 K/UL
WHITE BLOOD CELLS URINE: 3 /HPF

## 2020-07-10 DIAGNOSIS — R80.9 PROTEINURIA, UNSPECIFIED: ICD-10-CM

## 2020-07-14 LAB
CREAT 24H UR-MCNC: 2.3 G/24 H
CREAT ?TM UR-MCNC: 250 MG/DL
PROT 24H UR-MRATE: 27 MG/DL
PROT ?TM UR-MCNC: 24 HR
PROT UR-MCNC: 250 MG/24 H
SPECIMEN VOL 24H UR: 925 ML

## 2020-07-23 ENCOUNTER — APPOINTMENT (OUTPATIENT)
Dept: INTERNAL MEDICINE | Facility: CLINIC | Age: 50
End: 2020-07-23
Payer: COMMERCIAL

## 2020-07-23 VITALS
OXYGEN SATURATION: 99 % | SYSTOLIC BLOOD PRESSURE: 120 MMHG | HEIGHT: 70 IN | TEMPERATURE: 97.6 F | WEIGHT: 251 LBS | DIASTOLIC BLOOD PRESSURE: 80 MMHG | HEART RATE: 75 BPM | BODY MASS INDEX: 35.93 KG/M2

## 2020-07-23 DIAGNOSIS — Z79.899 OTHER LONG TERM (CURRENT) DRUG THERAPY: ICD-10-CM

## 2020-07-23 PROCEDURE — 99213 OFFICE O/P EST LOW 20 MIN: CPT | Mod: 25

## 2020-07-23 PROCEDURE — 36415 COLL VENOUS BLD VENIPUNCTURE: CPT

## 2020-07-23 NOTE — PHYSICAL EXAM
[No Acute Distress] : no acute distress [Clear to Auscultation] : lungs were clear to auscultation bilaterally [No Respiratory Distress] : no respiratory distress  [Normal Rate] : normal rate  [Normal Mood] : the mood was normal [Normal Affect] : the affect was normal [Regular Rhythm] : with a regular rhythm [General Appearance - In No Acute Distress] : in no acute distress [Respiration, Rhythm And Depth] : normal respiratory rhythm and effort [] : no respiratory distress [Heart Rate And Rhythm] : heart rate was normal and rhythm regular [Auscultation Breath Sounds / Voice Sounds] : lungs were clear to auscultation bilaterally [Mood] : the mood was normal [Affect] : the affect was normal

## 2020-07-23 NOTE — HISTORY OF PRESENT ILLNESS
[FreeTextEntry1] : Patient presents for followup on new medication, see last note as patient was started on Ramipril 2.5 mg for renal protection in the setting of T2DM. Patient is taking daily without any adverse reactions. Patient needs BP//BMP check\par -has thyroid biopsy scheduled for 7/31

## 2020-07-23 NOTE — ASSESSMENT
[FreeTextEntry1] : Venipuncture done in our office, Labs sent out and further recommendations will be made based on lab results.Patient continues to need lifestyle changes with weight loss. Patient will return to office in oct for 3month check\par \par \par echo was 8/2019\par Thyroid sonogram=7/2020 "has bx sched"\par Mammogram was 7/2020\par specialists include\par 1. GYN=Dr. Storey\par 2. Endocrinology-Dr. Leahy-prn-to go if thyroid sono changes\par 3.hematology-Dr. Junior-declines follow up\par 4. Orthopedic-Dr. Starkey-no need for follow up"knee is fine now"\par 5.ophthalmology-Dr. Roberts-11/2018- "to do"\par 6.Surgeon-Dr. Esquivel\par 7.Cardio-Dr. Mancia\par declines flu vaccine,declines pneumococcal vaccine\par colonoscopy-10/2019-next in 10years\par stress test 12/2019\par Microalbumin 7/2020\par 24 hour urine 7/2020

## 2020-07-24 LAB
ANION GAP SERPL CALC-SCNC: 13 MMOL/L
BUN SERPL-MCNC: 13 MG/DL
CALCIUM SERPL-MCNC: 9.3 MG/DL
CHLORIDE SERPL-SCNC: 102 MMOL/L
CO2 SERPL-SCNC: 24 MMOL/L
CREAT SERPL-MCNC: 0.89 MG/DL
GLUCOSE SERPL-MCNC: 210 MG/DL
POTASSIUM SERPL-SCNC: 4.2 MMOL/L
SODIUM SERPL-SCNC: 139 MMOL/L

## 2020-08-06 ENCOUNTER — RX RENEWAL (OUTPATIENT)
Age: 50
End: 2020-08-06

## 2020-10-05 ENCOUNTER — APPOINTMENT (OUTPATIENT)
Dept: INTERNAL MEDICINE | Facility: CLINIC | Age: 50
End: 2020-10-05
Payer: COMMERCIAL

## 2020-10-05 VITALS
OXYGEN SATURATION: 98 % | SYSTOLIC BLOOD PRESSURE: 120 MMHG | DIASTOLIC BLOOD PRESSURE: 80 MMHG | BODY MASS INDEX: 36.36 KG/M2 | HEART RATE: 75 BPM | RESPIRATION RATE: 14 BRPM | WEIGHT: 254 LBS | HEIGHT: 70 IN | TEMPERATURE: 97.8 F

## 2020-10-05 PROCEDURE — 36415 COLL VENOUS BLD VENIPUNCTURE: CPT

## 2020-10-05 PROCEDURE — 99213 OFFICE O/P EST LOW 20 MIN: CPT | Mod: 25

## 2020-10-05 NOTE — ASSESSMENT
[FreeTextEntry1] : Venipuncture done in our office, Labs sent out and further recommendations will be made based on lab results.Patient continues to need lifestyle changes with weight loss. Patient will return to office in 3-4months\par \par \par echo was 8/2019\par Thyroid sonogram=7/2020 w/negative biopsy\par Mammogram was 7/2020\par specialists include\par 1. GYN=Dr. Storey\par 2. Endocrinology-Dr. Leahy-prn-to go if thyroid sono changes\par 3.hematology-Dr. Junior-declines follow up\par 4. Orthopedic-Dr. Starkey-no need for follow up"knee is fine now"\par 5.ophthalmology-Dr. Roberts-11/2018- "to do"\par 6.Surgeon-Dr. Esquivel\par 7.Cardio-Dr. Mancia\par declines flu vaccine,declines pneumococcal vaccine/shingrix vaccine\par colonoscopy-10/2019-next in 10years\par stress test 12/2019\par Microalbumin 7/2020\par 24 hour urine 7/2020

## 2020-10-06 LAB
ALBUMIN SERPL ELPH-MCNC: 4.4 G/DL
ALP BLD-CCNC: 97 U/L
ALT SERPL-CCNC: 26 U/L
ANION GAP SERPL CALC-SCNC: 11 MMOL/L
AST SERPL-CCNC: 18 U/L
BASOPHILS # BLD AUTO: 0.01 K/UL
BASOPHILS NFR BLD AUTO: 0.2 %
BILIRUB SERPL-MCNC: 0.5 MG/DL
BUN SERPL-MCNC: 12 MG/DL
CALCIUM SERPL-MCNC: 9.3 MG/DL
CHLORIDE SERPL-SCNC: 102 MMOL/L
CHOLEST SERPL-MCNC: 136 MG/DL
CHOLEST/HDLC SERPL: 3.4 RATIO
CO2 SERPL-SCNC: 27 MMOL/L
CREAT SERPL-MCNC: 0.9 MG/DL
EOSINOPHIL # BLD AUTO: 0.2 K/UL
EOSINOPHIL NFR BLD AUTO: 3.7 %
ESTIMATED AVERAGE GLUCOSE: 154 MG/DL
GLUCOSE SERPL-MCNC: 144 MG/DL
HBA1C MFR BLD HPLC: 7 %
HCT VFR BLD CALC: 41.8 %
HDLC SERPL-MCNC: 41 MG/DL
HGB BLD-MCNC: 13.3 G/DL
IMM GRANULOCYTES NFR BLD AUTO: 0.2 %
LDLC SERPL CALC-MCNC: 80 MG/DL
LYMPHOCYTES # BLD AUTO: 2.78 K/UL
LYMPHOCYTES NFR BLD AUTO: 50.8 %
MAN DIFF?: NORMAL
MCHC RBC-ENTMCNC: 28.7 PG
MCHC RBC-ENTMCNC: 31.8 GM/DL
MCV RBC AUTO: 90.1 FL
MONOCYTES # BLD AUTO: 0.31 K/UL
MONOCYTES NFR BLD AUTO: 5.7 %
NEUTROPHILS # BLD AUTO: 2.16 K/UL
NEUTROPHILS NFR BLD AUTO: 39.4 %
PLATELET # BLD AUTO: 303 K/UL
POTASSIUM SERPL-SCNC: 4.5 MMOL/L
PROT SERPL-MCNC: 7.1 G/DL
RBC # BLD: 4.64 M/UL
RBC # FLD: 12.9 %
SODIUM SERPL-SCNC: 141 MMOL/L
TRIGL SERPL-MCNC: 77 MG/DL
TSH SERPL-ACNC: 0.63 UIU/ML
WBC # FLD AUTO: 5.47 K/UL

## 2020-10-19 ENCOUNTER — APPOINTMENT (OUTPATIENT)
Dept: CARDIOLOGY | Facility: CLINIC | Age: 50
End: 2020-10-19
Payer: COMMERCIAL

## 2020-10-19 ENCOUNTER — NON-APPOINTMENT (OUTPATIENT)
Age: 50
End: 2020-10-19

## 2020-10-19 VITALS
SYSTOLIC BLOOD PRESSURE: 142 MMHG | TEMPERATURE: 98 F | DIASTOLIC BLOOD PRESSURE: 86 MMHG | OXYGEN SATURATION: 96 % | HEART RATE: 68 BPM | WEIGHT: 254 LBS | BODY MASS INDEX: 36.36 KG/M2 | HEIGHT: 70 IN

## 2020-10-19 PROCEDURE — 93000 ELECTROCARDIOGRAM COMPLETE: CPT

## 2020-10-19 PROCEDURE — 99072 ADDL SUPL MATRL&STAF TM PHE: CPT

## 2020-10-19 PROCEDURE — 99213 OFFICE O/P EST LOW 20 MIN: CPT

## 2020-10-19 RX ORDER — AZELASTINE HYDROCHLORIDE 137 UG/1
0.1 SPRAY, METERED NASAL TWICE DAILY
Qty: 1 | Refills: 0 | Status: DISCONTINUED | COMMUNITY
Start: 2020-04-08 | End: 2020-10-19

## 2020-10-19 NOTE — HISTORY OF PRESENT ILLNESS
[FreeTextEntry1] : Patient is a 49-year-old  female who presents for follow-up cardiac consultation .Patient has a history of hyperlipidemia. Patient had a stress test in December 2019 which was normal. Patient denies any chest tightness, palpitations or syncope.\par Patient reports no new complaints since last visit.  Patient has positive COVID-19 antibody.\par Patient has  history HTN/ diabetes mellitus, she is a nonsmoker.

## 2020-10-19 NOTE — PHYSICAL EXAM
[Normal Conjunctiva] : the conjunctiva exhibited no abnormalities [General Appearance - Well Nourished] : well nourished [General Appearance - Well Developed] : well developed [Normal Oral Mucosa] : normal oral mucosa [Auscultation Breath Sounds / Voice Sounds] : lungs were clear to auscultation bilaterally [Heart Rate And Rhythm] : heart rate and rhythm were normal [Heart Sounds] : normal S1 and S2 [Bowel Sounds] : normal bowel sounds [Cyanosis, Localized] : no localized cyanosis [Abdomen Soft] : soft [Abnormal Walk] : normal gait [] : no rash [FreeTextEntry1] : No JVD

## 2020-10-19 NOTE — ASSESSMENT
[FreeTextEntry1] : EKG-October 19, 2020: NSR. Normal\par \par Echo 8/2019- LVEF 55-60%, Mild MR and Mild to Mod TR\par Regular stress test: December 2, 2019: Negative stress test at 103% MPHR, patient exercised for 8 minutes and 30 seconds, achieved 10 METS workload.\par \par Assessment:\par 1. Exertional Dyspnea- probably related to her weight.\par 2. Mild MR/Mild to Mod TR\par \par Recommendations:\par 1. Continue current medical therapy\par 2. F/u in 1 year

## 2020-11-02 ENCOUNTER — RX RENEWAL (OUTPATIENT)
Age: 50
End: 2020-11-02

## 2020-11-12 ENCOUNTER — NON-APPOINTMENT (OUTPATIENT)
Age: 50
End: 2020-11-12

## 2021-01-13 ENCOUNTER — APPOINTMENT (OUTPATIENT)
Dept: INTERNAL MEDICINE | Facility: CLINIC | Age: 51
End: 2021-01-13
Payer: COMMERCIAL

## 2021-01-13 VITALS
TEMPERATURE: 98.2 F | BODY MASS INDEX: 35.07 KG/M2 | DIASTOLIC BLOOD PRESSURE: 80 MMHG | OXYGEN SATURATION: 98 % | WEIGHT: 245 LBS | HEART RATE: 74 BPM | HEIGHT: 70 IN | RESPIRATION RATE: 13 BRPM | SYSTOLIC BLOOD PRESSURE: 125 MMHG

## 2021-01-13 PROCEDURE — 99072 ADDL SUPL MATRL&STAF TM PHE: CPT

## 2021-01-13 PROCEDURE — 36415 COLL VENOUS BLD VENIPUNCTURE: CPT

## 2021-01-13 PROCEDURE — 99213 OFFICE O/P EST LOW 20 MIN: CPT | Mod: 25

## 2021-01-13 NOTE — HISTORY OF PRESENT ILLNESS
[FreeTextEntry1] : Patient presents for followup on fatty liver/T2DM/obesity. Patient is trying to improve her fatty liver/T2DM with lifestyle changes as well as her obesity, is on metformin for type 2 diabetes and Crestor for hyperlipidemia.\par -feels well, no complaints

## 2021-01-13 NOTE — ASSESSMENT
[FreeTextEntry1] : Venipuncture done in our office, Labs sent out and further recommendations will be made based on lab results.Patient continues to need lifestyle changes with weight loss. Patient will return to office in 3-4months\par \par echo was 8/2019\par Thyroid sonogram=7/2020 w/negative biopsy\par Mammogram was 7/2020\par specialists include\par 1. GYN=Dr. Storey\par 2. Endocrinology-Dr. Leahy-prn-to go if thyroid sono changes\par 3.hematology-Dr. Junior-declines follow up\par 4. Orthopedic-Dr. Starkey-no need for follow up"knee is fine now"\par 5.ophthalmology-Dr. Roberts-11/2020\par 6.Surgeon-Dr. Esquivel\par 7.Cardio-Dr. Mancia\par declines flu vaccine,declines pneumococcal vaccine/shingrix/covid vaccine\par colonoscopy-10/2019-next in 10years\par stress test 12/2019\par Microalbumin 7/2020\par 24 hour urine 7/2020

## 2021-01-13 NOTE — PHYSICAL EXAM
[General Appearance - In No Acute Distress] : in no acute distress [] : no respiratory distress [Respiration, Rhythm And Depth] : normal respiratory rhythm and effort [Auscultation Breath Sounds / Voice Sounds] : lungs were clear to auscultation bilaterally [Affect] : the affect was normal [Heart Rate And Rhythm] : heart rate was normal and rhythm regular [Mood] : the mood was normal

## 2021-01-14 LAB
ALBUMIN SERPL ELPH-MCNC: 4.3 G/DL
ALP BLD-CCNC: 96 U/L
ALT SERPL-CCNC: 22 U/L
ANION GAP SERPL CALC-SCNC: 12 MMOL/L
AST SERPL-CCNC: 19 U/L
BASOPHILS # BLD AUTO: 0.01 K/UL
BASOPHILS NFR BLD AUTO: 0.2 %
BILIRUB SERPL-MCNC: 0.3 MG/DL
BUN SERPL-MCNC: 12 MG/DL
CALCIUM SERPL-MCNC: 9.2 MG/DL
CHLORIDE SERPL-SCNC: 104 MMOL/L
CHOLEST SERPL-MCNC: 148 MG/DL
CO2 SERPL-SCNC: 25 MMOL/L
CREAT SERPL-MCNC: 0.98 MG/DL
EOSINOPHIL # BLD AUTO: 0.26 K/UL
EOSINOPHIL NFR BLD AUTO: 4.4 %
ESTIMATED AVERAGE GLUCOSE: 163 MG/DL
GLUCOSE SERPL-MCNC: 145 MG/DL
HBA1C MFR BLD HPLC: 7.3 %
HCT VFR BLD CALC: 43.2 %
HDLC SERPL-MCNC: 35 MG/DL
HGB BLD-MCNC: 13.1 G/DL
IMM GRANULOCYTES NFR BLD AUTO: 0.2 %
LDLC SERPL CALC-MCNC: 93 MG/DL
LYMPHOCYTES # BLD AUTO: 3.02 K/UL
LYMPHOCYTES NFR BLD AUTO: 50.6 %
MAN DIFF?: NORMAL
MCHC RBC-ENTMCNC: 28 PG
MCHC RBC-ENTMCNC: 30.3 GM/DL
MCV RBC AUTO: 92.3 FL
MONOCYTES # BLD AUTO: 0.32 K/UL
MONOCYTES NFR BLD AUTO: 5.4 %
NEUTROPHILS # BLD AUTO: 2.35 K/UL
NEUTROPHILS NFR BLD AUTO: 39.2 %
NONHDLC SERPL-MCNC: 114 MG/DL
PLATELET # BLD AUTO: 326 K/UL
POTASSIUM SERPL-SCNC: 4.7 MMOL/L
PROT SERPL-MCNC: 7 G/DL
RBC # BLD: 4.68 M/UL
RBC # FLD: 12.8 %
SODIUM SERPL-SCNC: 140 MMOL/L
TRIGL SERPL-MCNC: 105 MG/DL
TSH SERPL-ACNC: 1.33 UIU/ML
WBC # FLD AUTO: 5.97 K/UL

## 2021-04-12 ENCOUNTER — APPOINTMENT (OUTPATIENT)
Dept: INTERNAL MEDICINE | Facility: CLINIC | Age: 51
End: 2021-04-12
Payer: COMMERCIAL

## 2021-04-12 VITALS
TEMPERATURE: 97.3 F | SYSTOLIC BLOOD PRESSURE: 124 MMHG | HEART RATE: 87 BPM | DIASTOLIC BLOOD PRESSURE: 80 MMHG | WEIGHT: 250 LBS | HEIGHT: 70 IN | OXYGEN SATURATION: 97 % | BODY MASS INDEX: 35.79 KG/M2 | RESPIRATION RATE: 14 BRPM

## 2021-04-12 PROCEDURE — 99072 ADDL SUPL MATRL&STAF TM PHE: CPT

## 2021-04-12 PROCEDURE — 36415 COLL VENOUS BLD VENIPUNCTURE: CPT

## 2021-04-12 PROCEDURE — 99214 OFFICE O/P EST MOD 30 MIN: CPT | Mod: 25

## 2021-04-13 LAB
ALBUMIN SERPL ELPH-MCNC: 4.3 G/DL
ALP BLD-CCNC: 111 U/L
ALT SERPL-CCNC: 22 U/L
ANION GAP SERPL CALC-SCNC: 12 MMOL/L
AST SERPL-CCNC: 18 U/L
BASOPHILS # BLD AUTO: 0.01 K/UL
BASOPHILS NFR BLD AUTO: 0.2 %
BILIRUB SERPL-MCNC: 0.3 MG/DL
BUN SERPL-MCNC: 13 MG/DL
CALCIUM SERPL-MCNC: 9.7 MG/DL
CHLORIDE SERPL-SCNC: 100 MMOL/L
CHOLEST SERPL-MCNC: 142 MG/DL
CO2 SERPL-SCNC: 27 MMOL/L
CREAT SERPL-MCNC: 1.08 MG/DL
EOSINOPHIL # BLD AUTO: 0.23 K/UL
EOSINOPHIL NFR BLD AUTO: 3.7 %
ESTIMATED AVERAGE GLUCOSE: 183 MG/DL
GLUCOSE SERPL-MCNC: 201 MG/DL
HBA1C MFR BLD HPLC: 8 %
HCT VFR BLD CALC: 43.1 %
HDLC SERPL-MCNC: 38 MG/DL
HGB BLD-MCNC: 13.6 G/DL
IMM GRANULOCYTES NFR BLD AUTO: 0.2 %
LDLC SERPL CALC-MCNC: 65 MG/DL
LYMPHOCYTES # BLD AUTO: 3.14 K/UL
LYMPHOCYTES NFR BLD AUTO: 50.6 %
MAN DIFF?: NORMAL
MCHC RBC-ENTMCNC: 28.3 PG
MCHC RBC-ENTMCNC: 31.6 GM/DL
MCV RBC AUTO: 89.6 FL
MONOCYTES # BLD AUTO: 0.29 K/UL
MONOCYTES NFR BLD AUTO: 4.7 %
NEUTROPHILS # BLD AUTO: 2.52 K/UL
NEUTROPHILS NFR BLD AUTO: 40.6 %
NONHDLC SERPL-MCNC: 105 MG/DL
PLATELET # BLD AUTO: 329 K/UL
POTASSIUM SERPL-SCNC: 4.5 MMOL/L
PROT SERPL-MCNC: 7.2 G/DL
RBC # BLD: 4.81 M/UL
RBC # FLD: 12.6 %
SODIUM SERPL-SCNC: 138 MMOL/L
TRIGL SERPL-MCNC: 198 MG/DL
TSH SERPL-ACNC: 0.81 UIU/ML
WBC # FLD AUTO: 6.2 K/UL

## 2021-04-14 ENCOUNTER — NON-APPOINTMENT (OUTPATIENT)
Age: 51
End: 2021-04-14

## 2021-04-14 NOTE — HISTORY OF PRESENT ILLNESS
[FreeTextEntry1] : Patient presents for followup on fatty liver/T2DM/obesity. Patient is trying to improve her fatty liver/T2DM with lifestyle changes as well as her obesity, is on metformin for type 2 diabetes and Crestor for hyperlipidemia.\par -feels well, no complaints, not fasting

## 2021-04-14 NOTE — ASSESSMENT
[FreeTextEntry1] : Venipuncture done in our office, Labs sent out and further recommendations will be made based on lab results.Patient continues to need lifestyle changes with weight loss. Patient will return to office in 3-4months for CP\par \par echo was 8/2019\par Thyroid sonogram=7/2020 w/negative biopsy\par Mammogram was 7/2020\par specialists include\par 1. GYN=Dr. Storey\par 2. Endocrinology-Dr. Leahy-prn-to go if thyroid sono changes\par 3.hematology-Dr. Junior-declines follow up\par 4. Orthopedic-Dr. Starkey-no need for follow up"knee is fine now"\par 5.ophthalmology-Dr. Reyes-11/2020\par 6.Surgeon-Dr. Esquivel\par 7.Cardio-Dr. Mancia\par declines flu vaccine,declines pneumococcal vaccine/shingrix/covid vaccine\par colonoscopy-10/2019-next in 10years\par stress test 12/2019\par Microalbumin 7/2020\par 24 hour urine 7/2020

## 2021-06-10 ENCOUNTER — APPOINTMENT (OUTPATIENT)
Dept: INTERNAL MEDICINE | Facility: CLINIC | Age: 51
End: 2021-06-10
Payer: COMMERCIAL

## 2021-06-10 VITALS
HEART RATE: 75 BPM | SYSTOLIC BLOOD PRESSURE: 130 MMHG | DIASTOLIC BLOOD PRESSURE: 80 MMHG | RESPIRATION RATE: 13 BRPM | TEMPERATURE: 97.3 F

## 2021-06-10 DIAGNOSIS — F32.9 MAJOR DEPRESSIVE DISORDER, SINGLE EPISODE, UNSPECIFIED: ICD-10-CM

## 2021-06-10 PROCEDURE — 99214 OFFICE O/P EST MOD 30 MIN: CPT

## 2021-06-10 PROCEDURE — 99072 ADDL SUPL MATRL&STAF TM PHE: CPT

## 2021-06-10 NOTE — ASSESSMENT
[FreeTextEntry1] : Forms completed and faxed.Therapist referral given. Patient does not feel she needs medication at this time, we will consider in the future. Patient will return to the office in 4 weeks for followup and further recommendations to be made at that time.\par \par Dr. Crystal was present in office building while I examined patient\par

## 2021-06-10 NOTE — HISTORY OF PRESENT ILLNESS
[FreeTextEntry8] : Patient presents for forms for work\par Patient is requesting leave of absence due to Reactive depression/anxiety. Patient states she is dealing with her mom's medical issue, still grieving the death of her dad which was a year ago but she was unable to have services for him due to covid pandemic therefore just had [facility] on 5/22/21. Patient is also caring for her ill god mother. Patient states she is having difficulty focusing at work and is afraid that it will now affect her job function. Patient works as an . Patient's first day of missed work was 6/7/21. Patient is not depressed or suicidal in any way. Patient is sad, mentally overwhelmed

## 2021-07-25 NOTE — PHYSICAL EXAM
[No Acute Distress] : no acute distress [Normal Affect] : the affect was normal [Normal Mood] : the mood was normal [Normal Insight/Judgement] : insight and judgment were intact

## 2021-07-26 ENCOUNTER — APPOINTMENT (OUTPATIENT)
Dept: INTERNAL MEDICINE | Facility: CLINIC | Age: 51
End: 2021-07-26
Payer: COMMERCIAL

## 2021-07-26 ENCOUNTER — RX RENEWAL (OUTPATIENT)
Age: 51
End: 2021-07-26

## 2021-07-26 VITALS
HEIGHT: 70 IN | SYSTOLIC BLOOD PRESSURE: 125 MMHG | BODY MASS INDEX: 35.5 KG/M2 | OXYGEN SATURATION: 98 % | TEMPERATURE: 97 F | RESPIRATION RATE: 14 BRPM | HEART RATE: 88 BPM | DIASTOLIC BLOOD PRESSURE: 80 MMHG | WEIGHT: 248 LBS

## 2021-07-26 PROCEDURE — 99072 ADDL SUPL MATRL&STAF TM PHE: CPT

## 2021-07-26 PROCEDURE — 99213 OFFICE O/P EST LOW 20 MIN: CPT

## 2021-08-12 NOTE — ASSESSMENT
[FreeTextEntry1] : To continue therapy. RTW in 2-3 weeks, awaiting forms to complete. Patient will return to the office  for CP as sched\par \par \par

## 2021-08-12 NOTE — HISTORY OF PRESENT ILLNESS
[FreeTextEntry1] : Followup [FreeTextEntry8] : Patient presents for followup from last visit...\par \par LAST VISIT WAS 6/10/21\par Patient is requesting leave of absence due to Reactive depression/anxiety. Patient states she is dealing with her mom's medical issue, still grieving the death of her dad which was a year ago but she was unable to have services for him due to covid pandemic therefore just had [facility] on 21. Patient is also caring for her ill god mother. Patient states she is having difficulty focusing at work and is afraid that it will now affect her job function. Patient works as an . Patient's first day of missed work was 21. Patient is not depressed or suicidal in any way. Patient is sad, mentally overwhelmed\par \par NEW VISIT\par pt reports that her god mother  since last visit, coping ok. Now seeing therapist via telehealth Q week, 45min sessions with +benefit.Pt still does not feel that she needs meds. Anticipates going back to work beginning of aug

## 2021-09-13 ENCOUNTER — NON-APPOINTMENT (OUTPATIENT)
Age: 51
End: 2021-09-13

## 2021-10-01 ENCOUNTER — RX RENEWAL (OUTPATIENT)
Age: 51
End: 2021-10-01

## 2021-10-13 ENCOUNTER — APPOINTMENT (OUTPATIENT)
Dept: INTERNAL MEDICINE | Facility: CLINIC | Age: 51
End: 2021-10-13
Payer: COMMERCIAL

## 2021-10-13 VITALS
SYSTOLIC BLOOD PRESSURE: 130 MMHG | HEART RATE: 75 BPM | RESPIRATION RATE: 13 BRPM | TEMPERATURE: 97.1 F | DIASTOLIC BLOOD PRESSURE: 80 MMHG

## 2021-10-13 DIAGNOSIS — B35.3 TINEA PEDIS: ICD-10-CM

## 2021-10-13 PROCEDURE — 99213 OFFICE O/P EST LOW 20 MIN: CPT

## 2021-10-13 NOTE — HISTORY OF PRESENT ILLNESS
[FreeTextEntry8] : Patient presents complaining of skin change to bilateral feet for a few weeks, slightly itchy. Patient has had no change in lotions/etc., unsure etiology.

## 2021-10-13 NOTE — PHYSICAL EXAM
[No Acute Distress] : no acute distress [No Respiratory Distress] : no respiratory distress  [Clear to Auscultation] : lungs were clear to auscultation bilaterally [Normal Rate] : normal rate  [Regular Rhythm] : with a regular rhythm [Normal Affect] : the affect was normal [Normal Mood] : the mood was normal [de-identified] : +Tinea pedis bilateral feet (soles and in between toes), no secondary infection

## 2021-10-13 NOTE — ASSESSMENT
[FreeTextEntry1] : Lotrisone cream given. Patient will call if symptoms continue or worsen and return to the office as scheduled for regular followup\par \par Dr. Crystal was present in office building while I examined patient\par

## 2021-10-21 ENCOUNTER — NON-APPOINTMENT (OUTPATIENT)
Age: 51
End: 2021-10-21

## 2021-10-22 ENCOUNTER — APPOINTMENT (OUTPATIENT)
Dept: CARDIOLOGY | Facility: CLINIC | Age: 51
End: 2021-10-22
Payer: COMMERCIAL

## 2021-10-22 ENCOUNTER — NON-APPOINTMENT (OUTPATIENT)
Age: 51
End: 2021-10-22

## 2021-10-22 VITALS
DIASTOLIC BLOOD PRESSURE: 81 MMHG | TEMPERATURE: 98.9 F | HEIGHT: 70 IN | SYSTOLIC BLOOD PRESSURE: 120 MMHG | WEIGHT: 244 LBS | BODY MASS INDEX: 34.93 KG/M2 | HEART RATE: 75 BPM | RESPIRATION RATE: 15 BRPM | OXYGEN SATURATION: 96 %

## 2021-10-22 DIAGNOSIS — R06.00 DYSPNEA, UNSPECIFIED: ICD-10-CM

## 2021-10-22 PROCEDURE — 99213 OFFICE O/P EST LOW 20 MIN: CPT

## 2021-10-22 PROCEDURE — 93000 ELECTROCARDIOGRAM COMPLETE: CPT

## 2021-10-22 NOTE — ASSESSMENT
[FreeTextEntry1] : \par \par Echo 8/2019- LVEF 55-60%, Mild MR and Mild to Mod TR\par Regular stress test: December 2, 2019: Negative stress test at 103% MPHR, patient exercised for 8 minutes and 30 seconds, achieved 10 METS workload.\par \par EKG 10/22/2021- Sinus  Rhythm  - frequent ectopic ventricular beat s \par # VECs = 2\par -consider old anterior infarct. \par \par Assessment:\par 1. DM/HTN\par 2. Mild MR/Mild to Mod TR\par \par Recommendations:\par Patient has no cardiopulmonary complaints\par \par 1. Continue current medical therapy\par 2. F/u in 1 year

## 2021-10-22 NOTE — HISTORY OF PRESENT ILLNESS
[FreeTextEntry1] : Patient is a 51-year-old  female who presents for follow-up cardiac consultation .Patient has a history of hyperlipidemia. Patient presents for f/u.. Patient denies any chest tightness, palpitations or syncope.\par Patient has  history HTN/ diabetes mellitus, she is a nonsmoker.

## 2021-11-04 ENCOUNTER — APPOINTMENT (OUTPATIENT)
Dept: INTERNAL MEDICINE | Facility: CLINIC | Age: 51
End: 2021-11-04
Payer: COMMERCIAL

## 2021-11-04 VITALS
SYSTOLIC BLOOD PRESSURE: 125 MMHG | DIASTOLIC BLOOD PRESSURE: 80 MMHG | RESPIRATION RATE: 14 BRPM | HEART RATE: 80 BPM | HEIGHT: 70 IN | WEIGHT: 243 LBS | BODY MASS INDEX: 34.79 KG/M2 | TEMPERATURE: 98.1 F

## 2021-11-04 DIAGNOSIS — Z13.31 ENCOUNTER FOR SCREENING FOR DEPRESSION: ICD-10-CM

## 2021-11-04 DIAGNOSIS — Z91.89 OTHER SPECIFIED PERSONAL RISK FACTORS, NOT ELSEWHERE CLASSIFIED: ICD-10-CM

## 2021-11-04 PROCEDURE — 36415 COLL VENOUS BLD VENIPUNCTURE: CPT

## 2021-11-04 PROCEDURE — 99396 PREV VISIT EST AGE 40-64: CPT | Mod: 25

## 2021-11-04 PROCEDURE — G0444 DEPRESSION SCREEN ANNUAL: CPT | Mod: NC,59

## 2021-11-05 LAB
ALBUMIN SERPL ELPH-MCNC: 4.2 G/DL
ALP BLD-CCNC: 100 U/L
ALT SERPL-CCNC: 15 U/L
ANION GAP SERPL CALC-SCNC: 14 MMOL/L
APPEARANCE: ABNORMAL
AST SERPL-CCNC: 16 U/L
BACTERIA: ABNORMAL
BASOPHILS # BLD AUTO: 0.01 K/UL
BASOPHILS NFR BLD AUTO: 0.2 %
BILIRUB SERPL-MCNC: 0.4 MG/DL
BILIRUBIN URINE: NEGATIVE
BLOOD URINE: NEGATIVE
BUN SERPL-MCNC: 14 MG/DL
CALCIUM SERPL-MCNC: 9.3 MG/DL
CHLORIDE SERPL-SCNC: 101 MMOL/L
CHOLEST SERPL-MCNC: 135 MG/DL
CO2 SERPL-SCNC: 24 MMOL/L
COLOR: YELLOW
CREAT SERPL-MCNC: 0.95 MG/DL
CREAT SPEC-SCNC: 180 MG/DL
EOSINOPHIL # BLD AUTO: 0.12 K/UL
EOSINOPHIL NFR BLD AUTO: 2.2 %
ESTIMATED AVERAGE GLUCOSE: 177 MG/DL
GLUCOSE QUALITATIVE U: NEGATIVE
GLUCOSE SERPL-MCNC: 123 MG/DL
HBA1C MFR BLD HPLC: 7.8 %
HCT VFR BLD CALC: 41.7 %
HDLC SERPL-MCNC: 40 MG/DL
HGB BLD-MCNC: 13.3 G/DL
HYALINE CASTS: 2 /LPF
IMM GRANULOCYTES NFR BLD AUTO: 0.2 %
KETONES URINE: NEGATIVE
LDLC SERPL CALC-MCNC: 77 MG/DL
LEUKOCYTE ESTERASE URINE: NEGATIVE
LYMPHOCYTES # BLD AUTO: 2.79 K/UL
LYMPHOCYTES NFR BLD AUTO: 50 %
MAN DIFF?: NORMAL
MCHC RBC-ENTMCNC: 28.9 PG
MCHC RBC-ENTMCNC: 31.9 GM/DL
MCV RBC AUTO: 90.5 FL
MICROALBUMIN 24H UR DL<=1MG/L-MCNC: 5.6 MG/DL
MICROALBUMIN/CREAT 24H UR-RTO: 31 MG/G
MICROSCOPIC-UA: NORMAL
MONOCYTES # BLD AUTO: 0.36 K/UL
MONOCYTES NFR BLD AUTO: 6.5 %
NEUTROPHILS # BLD AUTO: 2.29 K/UL
NEUTROPHILS NFR BLD AUTO: 40.9 %
NITRITE URINE: NEGATIVE
NONHDLC SERPL-MCNC: 95 MG/DL
PH URINE: 6
PLATELET # BLD AUTO: 304 K/UL
POTASSIUM SERPL-SCNC: 4.3 MMOL/L
PROT SERPL-MCNC: 6.9 G/DL
PROTEIN URINE: NORMAL
RBC # BLD: 4.61 M/UL
RBC # FLD: 12.8 %
RED BLOOD CELLS URINE: 2 /HPF
SODIUM SERPL-SCNC: 139 MMOL/L
SPECIFIC GRAVITY URINE: 1.02
SQUAMOUS EPITHELIAL CELLS: 8 /HPF
TRIGL SERPL-MCNC: 88 MG/DL
UROBILINOGEN URINE: ABNORMAL
WBC # FLD AUTO: 5.58 K/UL
WHITE BLOOD CELLS URINE: 11 /HPF

## 2021-11-05 NOTE — HISTORY OF PRESENT ILLNESS
[FreeTextEntry1] : 51-year-old female in for her yearly physical.\par \par Patient with known history of prediabetes with significant increased risk of diabetes secondary to family history and history of gestational diabetes.Unfortunately patient blood work 2019 showed her to be type II diabetic and has been on metformin since. Last blood work April 2020 with not good with hemoglobin A1c 8.\par She has lost 12 pounds since last year stating that she was doing Weight Watchers but not sticking to it any longer. No exercise. No further weight loss.\par \par she is generally feeling well without chest pain, palpitations, shortness of breath or edema \par \par Patient has significant dysfunctional uterine bleeding and ultimately had a robotic assisted hysterectomy October 2015 with resolution of her anemia.\par \par She has a left thyroid nodule for which she was following with endocrinologist Dr. Leahy.She had an FNA biopsy June of 2017= benign.\par Followup thyroid ultrasound July 2020 showed continued growth of dominant left nodule. She had an FNA biopsy with pathology = benign\par \par The patient had was a partial torn meniscus of her left knee October 2017.\par For this she saw orthopedic Dr Starkey who referred her for physical therapy which she did not do and her knee has improved.\par \par The patient does have a ventral hernia as well as umbilical hernia for which she has seen surgery who recommended surgery but not until patient loses weight.\par \par Cardiology evaluation 2019 with a negative stress test and echocardiogram Showing mild MR/TR/MT.\par \par she did struggle earlier this year with anxiety and depression mainly reaction to her father's death and the death of her Godmother who she was very close to. She is better. 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

## 2021-11-05 NOTE — HEALTH RISK ASSESSMENT
[Fair] : ~his/her~ current health as fair  [No] : In the past 12 months have you used drugs other than those required for medical reasons? No [No falls in past year] : Patient reported no falls in the past year [0] : 2) Feeling down, depressed, or hopeless: Not at all (0) [None] : None [With Significant Other] : lives with significant other [With Family] : lives with family [# of Members in Household ___] :  household currently consist of [unfilled] member(s) [Employed] : employed [College] : College [] :  [# Of Children ___] : has [unfilled] children [Feels Safe at Home] : Feels safe at home [Fully functional (bathing, dressing, toileting, transferring, walking, feeding)] : Fully functional (bathing, dressing, toileting, transferring, walking, feeding) [Fully functional (using the telephone, shopping, preparing meals, housekeeping, doing laundry, using] : Fully functional and needs no help or supervision to perform IADLs (using the telephone, shopping, preparing meals, housekeeping, doing laundry, using transportation, managing medications and managing finances) [Reports changes in vision] : Reports changes in vision [Smoke Detector] : smoke detector [Carbon Monoxide Detector] : carbon monoxide detector [Guns at Home] : guns at home [Seat Belt] :  uses seat belt [Discussed at today's visit] : Advance Directives Discussed at today's visit [Good] : ~his/her~  mood as  good [Reviewed no changes] : Reviewed, no changes [Designated Healthcare Proxy] : Designated healthcare proxy [Name: ___] : Health Care Proxy's Name: [unfilled]  [] : No [Audit-CScore] : 0 [de-identified] : no exercise [de-identified] : fair [[id number]] : 0 [Change in mental status noted] : No change in mental status noted [Reports changes in hearing] : Reports no changes in hearing [Reports changes in dental health] : Reports no changes in dental health [Sunscreen] : does not use sunscreen [de-identified] : early glaucoma [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

## 2021-11-05 NOTE — PHYSICAL EXAM
[General Appearance - Alert] : alert [General Appearance - In No Acute Distress] : in no acute distress [Sclera] : the sclera and conjunctiva were normal [PERRL With Normal Accommodation] : pupils were equal in size, round, and reactive to light [Extraocular Movements] : extraocular movements were intact [Outer Ear] : the ears and nose were normal in appearance [Oropharynx] : the oropharynx was normal [Neck Appearance] : the appearance of the neck was normal [Neck Cervical Mass (___cm)] : no neck mass was observed [Jugular Venous Distention Increased] : there was no jugular-venous distention [Thyroid Diffuse Enlargement] : the thyroid was not enlarged [Auscultation Breath Sounds / Voice Sounds] : lungs were clear to auscultation bilaterally [Heart Rate And Rhythm] : heart rate was normal and rhythm regular [Heart Sounds] : normal S1 and S2 [Heart Sounds Gallop] : no gallops [Murmurs] : no murmurs [Heart Sounds Pericardial Friction Rub] : no pericardial rub [Arterial Pulses Carotid] : carotid pulses were normal with no bruits [Abdominal Aorta] : the abdominal aorta was normal [Full Pulse] : the pedal pulses are present [Edema] : there was no peripheral edema [Bowel Sounds] : normal bowel sounds [Abdomen Soft] : soft [Abdomen Tenderness] : non-tender [Abdomen Mass (___ Cm)] : no abdominal mass palpated [Cervical Lymph Nodes Enlarged Posterior Bilaterally] : posterior cervical [Cervical Lymph Nodes Enlarged Anterior Bilaterally] : anterior cervical [Supraclavicular Lymph Nodes Enlarged Bilaterally] : supraclavicular [Axillary Lymph Nodes Enlarged Bilaterally] : axillary [Femoral Lymph Nodes Enlarged Bilaterally] : femoral [Inguinal Lymph Nodes Enlarged Bilaterally] : inguinal [No Spinal Tenderness] : no spinal tenderness [Abnormal Walk] : normal gait [Nail Clubbing] : no clubbing  or cyanosis of the fingernails [Musculoskeletal - Swelling] : no joint swelling seen [Motor Tone] : muscle strength and tone were normal [Skin Color & Pigmentation] : normal skin color and pigmentation [Skin Turgor] : normal skin turgor [] : no rash [Right Foot Was Examined] : right foot was examined [Left Foot Was Examined] : left foot was examined [Normal Appearance] : normal in appearance [Normal in Appearance] : normal in appearance [Normal] : normal [Cranial Nerves] : cranial nerves 2-12 were intact [No Focal Deficits] : no focal deficits [Oriented To Time, Place, And Person] : oriented to person, place, and time [Impaired Insight] : insight and judgment were intact [Affect] : the affect was normal [1+] : 1+ in the dorsalis pedis [#1 Diminished] : number 1 was normal [#2 Diminished] : number 2 was normal [#3 Diminished] : number 3 was normal [#4 Diminished] : number 4 was normal [#5 Diminished] : number 5 was normal [#6 Diminished] : number 6 was normal [#7 Diminished] : number 7 was normal [#8 Diminished] : number 8 was normal [#9 Diminished] : number 9 was normal [#10 Diminished] : number 10 was normal [FreeTextEntry1] : Umbilical keloids [Over the Past 2 Weeks, Have You Felt Down, Depressed, or Hopeless?] : 1.) Over the past 2 weeks, have you felt down, depressed, or hopeless? No [Over the Past 2 Weeks, Have You Felt Little Interest or Pleasure Doing Things?] : 2.) Over the past 2 weeks, have you felt little interest or pleasure doing things? No

## 2021-11-05 NOTE — ASSESSMENT
[FreeTextEntry1] : 51-year-old female with type 2 diabetes diagnosed May 2019 continues to need lifestyle changes with diet and exercise.\par Continue metformin as prescribed .\par \par Patient blood pressure is good but discussed renal protection using an ACEI/ARB therefore is on ramipril 2.5 mg daily.\par \par  Patient again long discussion and encouraged a low carb diet with regular exercise and resultant weight loss. Goal is for a 10-15 pound weight loss in the next 3 months. Recommended restart Weight Watchers.\par \par Patient at increased cardiovascular risk therefore given referral for coronary calcium CAT scan\par \par Thyroid ultrasound July 2020 with FNA biopsy of dominant left nodule = benign. Referral given for followup ultrasound\par Echocardiogram September 2019\par Mammography July 2020 therefore given referral for followup\par GYN followup overdue therefore to schedule\par Colonoscopy October 2019 with followup in 10 years\par Ophthalmology UTD\par \par Recieved the CIOVID vaccine\par continues to decline influenza and pneumococcal vaccines\par \par Followup in 3 months\par Venipuncture done today in the office

## 2021-11-05 NOTE — ADDENDUM
[FreeTextEntry1] : Blood work shows hemoglobin A1c slightly better at 7.8 but still uncontrolled.\par recommended and patient agrees to increase metformin  mg 2 tablets twice a day.\par Again told needs lifestyle changes with diet and exercise.

## 2021-11-12 ENCOUNTER — NON-APPOINTMENT (OUTPATIENT)
Age: 51
End: 2021-11-12

## 2021-11-21 LAB
CREAT 24H UR-MCNC: 2.1 G/24 H
CREAT ?TM UR-MCNC: 158 MG/DL
PROT 24H UR-MRATE: 13 MG/DL
PROT ?TM UR-MCNC: 24 HR
PROT UR-MCNC: 172 MG/24 H
SPECIMEN VOL 24H UR: 1325 ML

## 2021-11-22 ENCOUNTER — NON-APPOINTMENT (OUTPATIENT)
Age: 51
End: 2021-11-22

## 2021-12-02 ENCOUNTER — RX RENEWAL (OUTPATIENT)
Age: 51
End: 2021-12-02

## 2021-12-20 ENCOUNTER — OUTPATIENT (OUTPATIENT)
Dept: OUTPATIENT SERVICES | Facility: HOSPITAL | Age: 51
LOS: 1 days | End: 2021-12-20
Payer: COMMERCIAL

## 2021-12-20 ENCOUNTER — APPOINTMENT (OUTPATIENT)
Dept: CT IMAGING | Facility: CLINIC | Age: 51
End: 2021-12-20
Payer: SELF-PAY

## 2021-12-20 DIAGNOSIS — N84.0 POLYP OF CORPUS UTERI: Chronic | ICD-10-CM

## 2021-12-20 DIAGNOSIS — Z00.8 ENCOUNTER FOR OTHER GENERAL EXAMINATION: ICD-10-CM

## 2021-12-20 PROCEDURE — 75571 CT HRT W/O DYE W/CA TEST: CPT | Mod: 26

## 2021-12-20 PROCEDURE — 75571 CT HRT W/O DYE W/CA TEST: CPT

## 2022-02-03 ENCOUNTER — APPOINTMENT (OUTPATIENT)
Dept: INTERNAL MEDICINE | Facility: CLINIC | Age: 52
End: 2022-02-03
Payer: COMMERCIAL

## 2022-02-03 VITALS
HEART RATE: 74 BPM | BODY MASS INDEX: 34.79 KG/M2 | WEIGHT: 243 LBS | DIASTOLIC BLOOD PRESSURE: 85 MMHG | HEIGHT: 70 IN | RESPIRATION RATE: 13 BRPM | SYSTOLIC BLOOD PRESSURE: 125 MMHG | TEMPERATURE: 97 F

## 2022-02-03 LAB
ALBUMIN SERPL ELPH-MCNC: 4.5 G/DL
ALP BLD-CCNC: 80 U/L
ALT SERPL-CCNC: 13 U/L
ANION GAP SERPL CALC-SCNC: 15 MMOL/L
AST SERPL-CCNC: 14 U/L
BASOPHILS # BLD AUTO: 0.01 K/UL
BASOPHILS NFR BLD AUTO: 0.2 %
BILIRUB SERPL-MCNC: 0.4 MG/DL
BUN SERPL-MCNC: 11 MG/DL
CALCIUM SERPL-MCNC: 9.5 MG/DL
CHLORIDE SERPL-SCNC: 102 MMOL/L
CHOLEST SERPL-MCNC: 133 MG/DL
CO2 SERPL-SCNC: 23 MMOL/L
CREAT SERPL-MCNC: 0.78 MG/DL
EOSINOPHIL # BLD AUTO: 0.09 K/UL
EOSINOPHIL NFR BLD AUTO: 2.1 %
ESTIMATED AVERAGE GLUCOSE: 143 MG/DL
GLUCOSE SERPL-MCNC: 106 MG/DL
HBA1C MFR BLD HPLC: 6.6 %
HCT VFR BLD CALC: 40.6 %
HDLC SERPL-MCNC: 45 MG/DL
HGB BLD-MCNC: 13 G/DL
IMM GRANULOCYTES NFR BLD AUTO: 0.2 %
LDLC SERPL CALC-MCNC: 75 MG/DL
LYMPHOCYTES # BLD AUTO: 1.96 K/UL
LYMPHOCYTES NFR BLD AUTO: 45.8 %
MAN DIFF?: NORMAL
MCHC RBC-ENTMCNC: 28.4 PG
MCHC RBC-ENTMCNC: 32 GM/DL
MCV RBC AUTO: 88.8 FL
MONOCYTES # BLD AUTO: 0.22 K/UL
MONOCYTES NFR BLD AUTO: 5.1 %
NEUTROPHILS # BLD AUTO: 1.99 K/UL
NEUTROPHILS NFR BLD AUTO: 46.6 %
NONHDLC SERPL-MCNC: 88 MG/DL
PLATELET # BLD AUTO: 301 K/UL
POTASSIUM SERPL-SCNC: 4.3 MMOL/L
PROT SERPL-MCNC: 7 G/DL
RBC # BLD: 4.57 M/UL
RBC # FLD: 12.4 %
SODIUM SERPL-SCNC: 140 MMOL/L
TRIGL SERPL-MCNC: 66 MG/DL
TSH SERPL-ACNC: 0.85 UIU/ML
WBC # FLD AUTO: 4.28 K/UL

## 2022-02-03 PROCEDURE — 99214 OFFICE O/P EST MOD 30 MIN: CPT | Mod: 25

## 2022-02-03 PROCEDURE — 36415 COLL VENOUS BLD VENIPUNCTURE: CPT

## 2022-02-03 NOTE — ASSESSMENT
[FreeTextEntry1] : Venipuncture done in our office, Labs sent out and further recommendations will be made based on lab results.Patient continues to need lifestyle changes with weight loss. Patient will return to office in 3-4months \par \par echo was 8/2019\par Thyroid sonogram=7/2020 w/negative biopsy "to do"=referral given\par Mammogram was 7/2020 "to do"=referral given\par specialists include\par 1. GYN=Dr. Storey\par 2. Endocrinology-Dr. Leahy-prn-to go if thyroid sono changes\par 3.hematology-Dr. Junior-declines follow up\par 4. Orthopedic-Dr. Starkey-no need for follow up"knee is fine now"\par 5.ophthalmology-Dr. Reyes-9/2021\par 6.Surgeon-Dr. Esquivel\par 7.Cardio-Dr. Mancia\par declines flu vaccine,declines pneumococcal vaccine/shingrix vaccines but +got covid vaccines X2, to get booster\par colonoscopy-10/2019-next in 10years\par stress test 12/2019\par Microalbumin 11/2021\par 24 hour urine 11/2021\par CT calcium score = 0=12/2021= incidental pulmonary nodule seen= Recs are CT chest in 6 months

## 2022-02-03 NOTE — HISTORY OF PRESENT ILLNESS
[FreeTextEntry1] : Patient presents for followup on fatty liver/T2DM/obesity. Patient is trying to improve her fatty liver/T2DM with lifestyle changes as well as her obesity, is on metformin for type 2 diabetes and Crestor for hyperlipidemia.\par -feels well, no complaints, not fasting\par -got covid vaccines X2, to get booster in future

## 2022-02-04 ENCOUNTER — NON-APPOINTMENT (OUTPATIENT)
Age: 52
End: 2022-02-04

## 2022-02-15 ENCOUNTER — NON-APPOINTMENT (OUTPATIENT)
Age: 52
End: 2022-02-15

## 2022-04-11 PROBLEM — Z11.59 SCREENING FOR VIRAL DISEASE: Status: ACTIVE | Noted: 2020-07-06

## 2022-04-29 ENCOUNTER — RX RENEWAL (OUTPATIENT)
Age: 52
End: 2022-04-29

## 2022-05-12 ENCOUNTER — APPOINTMENT (OUTPATIENT)
Dept: INTERNAL MEDICINE | Facility: CLINIC | Age: 52
End: 2022-05-12
Payer: COMMERCIAL

## 2022-05-12 VITALS
RESPIRATION RATE: 14 BRPM | HEIGHT: 70 IN | HEART RATE: 74 BPM | DIASTOLIC BLOOD PRESSURE: 82 MMHG | WEIGHT: 248 LBS | SYSTOLIC BLOOD PRESSURE: 122 MMHG | BODY MASS INDEX: 35.5 KG/M2 | TEMPERATURE: 97 F | OXYGEN SATURATION: 98 %

## 2022-05-12 DIAGNOSIS — R91.1 SOLITARY PULMONARY NODULE: ICD-10-CM

## 2022-05-12 DIAGNOSIS — R68.89 OTHER GENERAL SYMPTOMS AND SIGNS: ICD-10-CM

## 2022-05-12 DIAGNOSIS — R22.1 LOCALIZED SWELLING, MASS AND LUMP, NECK: ICD-10-CM

## 2022-05-12 PROCEDURE — 99214 OFFICE O/P EST MOD 30 MIN: CPT | Mod: 25

## 2022-05-12 PROCEDURE — 36415 COLL VENOUS BLD VENIPUNCTURE: CPT

## 2022-05-12 NOTE — ASSESSMENT
[FreeTextEntry1] : Again stressed need to do followup thyroid sonogram, neck sonogram also ordered. Will likely need head and neck specialist evaluation pending imaging.Venipuncture done in our office, Labs sent out and further recommendations will be made based on lab results.Patient continues to need lifestyle changes with weight loss. Patient will return to office in 3-4months \par \par Dr. Crystal was present in office building while I examined patient\par \par \par echo was 8/2019\par Thyroid sonogram=7/2020 w/negative biopsy "to do"\par Mammogram was 7/2020 "to do"\par specialists include\par 1. GYN=Dr. Storey\par 2. Endocrinology-Dr. Leahy-prn-to go if thyroid sono changes\par 3.hematology-Dr. Junior-declines follow up\par 4. Orthopedic-Dr. Starkey-no need for follow up"knee is fine now"\par 5.ophthalmology-Dr. Reyes-2/2022\par 6.Surgeon-Dr. Esquivel\par 7.Cardio-Dr. Mancia\par declines flu vaccine,declines pneumococcal vaccine/shingrix vaccines but +got covid vaccines X2, to get booster\par colonoscopy-10/2019-next in 10years\par stress test 12/2019\par Microalbumin 11/2021\par 24 hour urine 11/2021\par CT calcium score = 0=12/2021= incidental pulmonary nodule seen= Recs are CT chest in 6 months=rx given

## 2022-05-12 NOTE — HISTORY OF PRESENT ILLNESS
[FreeTextEntry1] : Patient presents for followup on fatty liver/T2DM/obesity. Patient is trying to improve her fatty liver/T2DM with lifestyle changes as well as her obesity, is on metformin for type 2 diabetes and Crestor for hyperlipidemia.\par -got covid vaccines X2, to get booster in future\par - patient is questioning if her neck area has gotten bigger in size, "fat in the neck"

## 2022-05-12 NOTE — PHYSICAL EXAM
[General Appearance - In No Acute Distress] : in no acute distress [] : no respiratory distress [Respiration, Rhythm And Depth] : normal respiratory rhythm and effort [Auscultation Breath Sounds / Voice Sounds] : lungs were clear to auscultation bilaterally [Heart Rate And Rhythm] : heart rate was normal and rhythm regular [Affect] : the affect was normal [Mood] : the mood was normal [de-identified] : neck with significant enlargement, mainly over her left thyroid area,no tenderness

## 2022-05-13 ENCOUNTER — NON-APPOINTMENT (OUTPATIENT)
Age: 52
End: 2022-05-13

## 2022-05-13 LAB
ALBUMIN SERPL ELPH-MCNC: 4.5 G/DL
ALP BLD-CCNC: 84 U/L
ALT SERPL-CCNC: 18 U/L
ANION GAP SERPL CALC-SCNC: 14 MMOL/L
AST SERPL-CCNC: 18 U/L
BASOPHILS # BLD AUTO: 0.02 K/UL
BASOPHILS NFR BLD AUTO: 0.4 %
BILIRUB SERPL-MCNC: 0.4 MG/DL
BUN SERPL-MCNC: 14 MG/DL
CALCIUM SERPL-MCNC: 9.7 MG/DL
CHLORIDE SERPL-SCNC: 102 MMOL/L
CHOLEST SERPL-MCNC: 163 MG/DL
CO2 SERPL-SCNC: 25 MMOL/L
CREAT SERPL-MCNC: 0.9 MG/DL
EGFR: 77 ML/MIN/1.73M2
EOSINOPHIL # BLD AUTO: 0.16 K/UL
EOSINOPHIL NFR BLD AUTO: 3 %
ESTIMATED AVERAGE GLUCOSE: 143 MG/DL
GLUCOSE SERPL-MCNC: 122 MG/DL
HBA1C MFR BLD HPLC: 6.6 %
HCT VFR BLD CALC: 41.3 %
HDLC SERPL-MCNC: 50 MG/DL
HGB BLD-MCNC: 13.4 G/DL
IMM GRANULOCYTES NFR BLD AUTO: 0.2 %
LDLC SERPL CALC-MCNC: 95 MG/DL
LYMPHOCYTES # BLD AUTO: 2.8 K/UL
LYMPHOCYTES NFR BLD AUTO: 52.9 %
MAN DIFF?: NORMAL
MCHC RBC-ENTMCNC: 29.1 PG
MCHC RBC-ENTMCNC: 32.4 GM/DL
MCV RBC AUTO: 89.6 FL
MONOCYTES # BLD AUTO: 0.29 K/UL
MONOCYTES NFR BLD AUTO: 5.5 %
NEUTROPHILS # BLD AUTO: 2.01 K/UL
NEUTROPHILS NFR BLD AUTO: 38 %
NONHDLC SERPL-MCNC: 113 MG/DL
PLATELET # BLD AUTO: 310 K/UL
POTASSIUM SERPL-SCNC: 4.3 MMOL/L
PROT SERPL-MCNC: 7.2 G/DL
RBC # BLD: 4.61 M/UL
RBC # FLD: 12.8 %
SODIUM SERPL-SCNC: 140 MMOL/L
TRIGL SERPL-MCNC: 93 MG/DL
TSH SERPL-ACNC: 1.2 UIU/ML
WBC # FLD AUTO: 5.29 K/UL

## 2022-05-17 ENCOUNTER — NON-APPOINTMENT (OUTPATIENT)
Age: 52
End: 2022-05-17

## 2022-05-26 ENCOUNTER — APPOINTMENT (OUTPATIENT)
Dept: CT IMAGING | Facility: CLINIC | Age: 52
End: 2022-05-26
Payer: COMMERCIAL

## 2022-05-26 ENCOUNTER — OUTPATIENT (OUTPATIENT)
Dept: OUTPATIENT SERVICES | Facility: HOSPITAL | Age: 52
LOS: 1 days | End: 2022-05-26
Payer: COMMERCIAL

## 2022-05-26 ENCOUNTER — APPOINTMENT (OUTPATIENT)
Dept: SURGERY | Facility: CLINIC | Age: 52
End: 2022-05-26
Payer: COMMERCIAL

## 2022-05-26 DIAGNOSIS — Z00.8 ENCOUNTER FOR OTHER GENERAL EXAMINATION: ICD-10-CM

## 2022-05-26 DIAGNOSIS — N84.0 POLYP OF CORPUS UTERI: Chronic | ICD-10-CM

## 2022-05-26 DIAGNOSIS — R91.1 SOLITARY PULMONARY NODULE: ICD-10-CM

## 2022-05-26 PROCEDURE — 31575 DIAGNOSTIC LARYNGOSCOPY: CPT

## 2022-05-26 PROCEDURE — 71250 CT THORAX DX C-: CPT | Mod: 26

## 2022-05-26 PROCEDURE — 99204 OFFICE O/P NEW MOD 45 MIN: CPT | Mod: 25

## 2022-05-26 PROCEDURE — 71250 CT THORAX DX C-: CPT

## 2022-05-26 PROCEDURE — 36415 COLL VENOUS BLD VENIPUNCTURE: CPT

## 2022-05-26 NOTE — ASSESSMENT
[FreeTextEntry1] : bloods drawn.  CT requested. to call next week for results. discussed may need to consider surgery due to size of nodule, potential for false negative FNA and symptoms - lobectomy with recurrent nerve monitoring.   patient has been given the opportunity to ask questions, and all of the patient's questions have been answered to their satisfaction

## 2022-05-26 NOTE — PHYSICAL EXAM
[de-identified] : 8 cm left thyroid nodule, well circumscribed and mobile [Nasal Endoscopy Performed] : nasal endoscopy was performed, see procedure section for findings [Laryngoscopy Performed] : laryngoscopy was performed, see procedure section for findings [R] : deviated to the right [Normal] : orientation to person, place, and time: normal [de-identified] : fiberoptic laryngoscopy shows normal vocal cord mobility bilaterally with no lesions noted

## 2022-05-26 NOTE — REASON FOR VISIT
[Initial Consultation] : an initial consultation for [FreeTextEntry2] : Thyroid nodule [Other: _____] : [unfilled]

## 2022-05-26 NOTE — HISTORY OF PRESENT ILLNESS
[de-identified] : Pt c/o anterior neck mass,  dysphagia and SOB, particularly with neck flexion.  denies hoarseness or RT exposure\par sonogram:  left 7.0 cm thyroid nodule  (previously 4 cm) , Right 1.2 cm thyroid nodule\par FNA:  left 4.7 cm nodule negative for malignancy\par TSH 1.20\par I have reviewed all old and new data and available images.\par

## 2022-05-26 NOTE — CONSULT LETTER
[Dear  ___] : Dear  [unfilled], [Consult Letter:] : I had the pleasure of evaluating your patient, [unfilled]. [Please see my note below.] : Please see my note below. [Consult Closing:] : Thank you very much for allowing me to participate in the care of this patient.  If you have any questions, please do not hesitate to contact me. [Sincerely,] : Sincerely, [FreeTextEntry2] : Dr. Abiel Crystal [FreeTextEntry3] : Vernon Cordero MD, FACS\par System Director, Endocrine Surgery\par [facility]\par Associate  Professor of Surgery\par [facility] [employer] [facility]

## 2022-05-28 LAB
24R-OH-CALCIDIOL SERPL-MCNC: 74.2 PG/ML
CALCIUM SERPL-MCNC: 9.3 MG/DL
CALCIUM SERPL-MCNC: 9.3 MG/DL
PARATHYROID HORMONE INTACT: 36 PG/ML
T3 SERPL-MCNC: 118 NG/DL
T4 FREE SERPL-MCNC: 1.1 NG/DL
THYROGLOB AB SERPL-ACNC: <20 IU/ML
THYROPEROXIDASE AB SERPL IA-ACNC: <10 IU/ML
TSH SERPL-ACNC: 1.15 UIU/ML

## 2022-05-31 ENCOUNTER — NON-APPOINTMENT (OUTPATIENT)
Age: 52
End: 2022-05-31

## 2022-06-07 ENCOUNTER — NON-APPOINTMENT (OUTPATIENT)
Age: 52
End: 2022-06-07

## 2022-06-07 ENCOUNTER — APPOINTMENT (OUTPATIENT)
Dept: CT IMAGING | Facility: CLINIC | Age: 52
End: 2022-06-07
Payer: COMMERCIAL

## 2022-06-07 ENCOUNTER — OUTPATIENT (OUTPATIENT)
Dept: OUTPATIENT SERVICES | Facility: HOSPITAL | Age: 52
LOS: 1 days | End: 2022-06-07
Payer: COMMERCIAL

## 2022-06-07 DIAGNOSIS — E04.1 NONTOXIC SINGLE THYROID NODULE: ICD-10-CM

## 2022-06-07 DIAGNOSIS — Z00.00 ENCOUNTER FOR GENERAL ADULT MEDICAL EXAMINATION WITHOUT ABNORMAL FINDINGS: ICD-10-CM

## 2022-06-07 DIAGNOSIS — Z00.8 ENCOUNTER FOR OTHER GENERAL EXAMINATION: ICD-10-CM

## 2022-06-07 DIAGNOSIS — N84.0 POLYP OF CORPUS UTERI: Chronic | ICD-10-CM

## 2022-06-07 PROCEDURE — 70491 CT SOFT TISSUE NECK W/DYE: CPT | Mod: 26

## 2022-06-07 PROCEDURE — 70491 CT SOFT TISSUE NECK W/DYE: CPT

## 2022-06-14 ENCOUNTER — NON-APPOINTMENT (OUTPATIENT)
Age: 52
End: 2022-06-14

## 2022-06-20 ENCOUNTER — RX RENEWAL (OUTPATIENT)
Age: 52
End: 2022-06-20

## 2022-07-29 ENCOUNTER — RX RENEWAL (OUTPATIENT)
Age: 52
End: 2022-07-29

## 2022-08-03 ENCOUNTER — NON-APPOINTMENT (OUTPATIENT)
Age: 52
End: 2022-08-03

## 2022-09-01 ENCOUNTER — APPOINTMENT (OUTPATIENT)
Dept: INTERNAL MEDICINE | Facility: CLINIC | Age: 52
End: 2022-09-01

## 2022-09-01 VITALS
BODY MASS INDEX: 34.72 KG/M2 | HEART RATE: 70 BPM | DIASTOLIC BLOOD PRESSURE: 82 MMHG | RESPIRATION RATE: 13 BRPM | SYSTOLIC BLOOD PRESSURE: 120 MMHG | WEIGHT: 242 LBS

## 2022-09-01 PROCEDURE — 36415 COLL VENOUS BLD VENIPUNCTURE: CPT

## 2022-09-01 PROCEDURE — 99214 OFFICE O/P EST MOD 30 MIN: CPT | Mod: 25

## 2022-09-01 RX ORDER — MULTIVITAMIN
TABLET ORAL
Refills: 0 | Status: ACTIVE | COMMUNITY
Start: 2022-09-01

## 2022-09-01 RX ORDER — CLOTRIMAZOLE AND BETAMETHASONE DIPROPIONATE 10; .5 MG/G; MG/G
1-0.05 CREAM TOPICAL
Qty: 1 | Refills: 0 | Status: DISCONTINUED | COMMUNITY
Start: 2021-10-13 | End: 2022-09-01

## 2022-09-04 LAB
ALBUMIN SERPL ELPH-MCNC: 4.3 G/DL
ALP BLD-CCNC: 96 U/L
ALT SERPL-CCNC: 21 U/L
ANION GAP SERPL CALC-SCNC: 17 MMOL/L
APPEARANCE: CLEAR
AST SERPL-CCNC: 13 U/L
BACTERIA UR CULT: NORMAL
BACTERIA: ABNORMAL
BASOPHILS # BLD AUTO: 0.02 K/UL
BASOPHILS NFR BLD AUTO: 0.4 %
BILIRUB SERPL-MCNC: 0.3 MG/DL
BILIRUBIN URINE: NEGATIVE
BLOOD URINE: NEGATIVE
BUN SERPL-MCNC: 12 MG/DL
CALCIUM SERPL-MCNC: 9.8 MG/DL
CHLORIDE SERPL-SCNC: 94 MMOL/L
CHOLEST SERPL-MCNC: 152 MG/DL
CO2 SERPL-SCNC: 24 MMOL/L
COLOR: NORMAL
CREAT SERPL-MCNC: 0.82 MG/DL
EGFR: 86 ML/MIN/1.73M2
EOSINOPHIL # BLD AUTO: 0.18 K/UL
EOSINOPHIL NFR BLD AUTO: 3.4 %
ESTIMATED AVERAGE GLUCOSE: 246 MG/DL
GLUCOSE QUALITATIVE U: ABNORMAL
GLUCOSE SERPL-MCNC: 372 MG/DL
HBA1C MFR BLD HPLC: 10.2 %
HCT VFR BLD CALC: 41.1 %
HDLC SERPL-MCNC: 39 MG/DL
HGB BLD-MCNC: 13.5 G/DL
HYALINE CASTS: 2 /LPF
IMM GRANULOCYTES NFR BLD AUTO: 0 %
KETONES URINE: NEGATIVE
LDLC SERPL CALC-MCNC: 78 MG/DL
LEUKOCYTE ESTERASE URINE: NEGATIVE
LYMPHOCYTES # BLD AUTO: 2.93 K/UL
LYMPHOCYTES NFR BLD AUTO: 56.1 %
MAN DIFF?: NORMAL
MCHC RBC-ENTMCNC: 28.9 PG
MCHC RBC-ENTMCNC: 32.8 GM/DL
MCV RBC AUTO: 88 FL
MICROSCOPIC-UA: NORMAL
MONOCYTES # BLD AUTO: 0.3 K/UL
MONOCYTES NFR BLD AUTO: 5.7 %
NEUTROPHILS # BLD AUTO: 1.79 K/UL
NEUTROPHILS NFR BLD AUTO: 34.4 %
NITRITE URINE: NEGATIVE
NONHDLC SERPL-MCNC: 114 MG/DL
PH URINE: 6
PLATELET # BLD AUTO: 305 K/UL
POTASSIUM SERPL-SCNC: 4.2 MMOL/L
PROT SERPL-MCNC: 7 G/DL
PROTEIN URINE: ABNORMAL
RBC # BLD: 4.67 M/UL
RBC # FLD: 12.2 %
RED BLOOD CELLS URINE: 5 /HPF
SODIUM SERPL-SCNC: 136 MMOL/L
SPECIFIC GRAVITY URINE: 1.03
SQUAMOUS EPITHELIAL CELLS: 5 /HPF
TRIGL SERPL-MCNC: 176 MG/DL
TSH SERPL-ACNC: 1.86 UIU/ML
UROBILINOGEN URINE: NORMAL
WBC # FLD AUTO: 5.22 K/UL
WHITE BLOOD CELLS URINE: 5 /HPF

## 2022-09-09 DIAGNOSIS — R82.90 UNSPECIFIED ABNORMAL FINDINGS IN URINE: ICD-10-CM

## 2022-09-10 LAB
APPEARANCE: CLEAR
BACTERIA: ABNORMAL
BILIRUBIN URINE: NEGATIVE
BLOOD URINE: NEGATIVE
COLOR: NORMAL
GLUCOSE QUALITATIVE U: ABNORMAL
HYALINE CASTS: 3 /LPF
KETONES URINE: ABNORMAL
LEUKOCYTE ESTERASE URINE: NEGATIVE
MICROSCOPIC-UA: NORMAL
NITRITE URINE: NEGATIVE
PH URINE: 6
PROTEIN URINE: NORMAL
RED BLOOD CELLS URINE: 3 /HPF
SPECIFIC GRAVITY URINE: 1.04
SQUAMOUS EPITHELIAL CELLS: 3 /HPF
UROBILINOGEN URINE: NORMAL
WHITE BLOOD CELLS URINE: 4 /HPF

## 2022-09-10 NOTE — ASSESSMENT
[FreeTextEntry1] : Urine sent out.Venipuncture done in our office, Labs sent out and further recommendations will be made based on lab results.Patient continues to need lifestyle changes with weight loss. Patient will return to office in 3-4months for CP\par \par Dr. Crystal was present in office building while I examined patient\par \par echo was 8/2019\par Thyroid sonogram=5/2022\par Mammogram was 5/2022\par specialists include\par 1. GYN=Dr. Storey\par 2. Endocrinology-Dr. Leahy-prn-\par 3.hematology-Dr. Junior-declines follow up\par 4. Orthopedic-Dr. Starkey-no need for follow up"knee is fine now"\par 5.ophthalmology-Dr. Reyes-6/2022\par 6.Surgeon-Dr. Esquivel\par 7.Cardio-Dr. Mancia\par 8.Head and neck MD-Dr. Cordero\par declines flu vaccine,declines pneumococcal vaccine/shingrix vaccines but +got covid vaccines X3\par colonoscopy-10/2019-next in 10years\par stress test 12/2019\par Microalbumin 11/2021\par 24 hour urine 11/2021\par CT calcium score = 0=12/2021= incidental pulmonary nodule seen= CT chest=5/2022

## 2022-09-10 NOTE — ADDENDUM
[FreeTextEntry1] : Labs show\par -E9GO=IR=tkr increasing metformin and adding rx=add jardiance 10mg QD\par -UA abnormal=repeat UA with CX\par \par repeat UA shows glucose due T2DM=UC=see above, repeat UA with next visit on added rx

## 2022-09-10 NOTE — HISTORY OF PRESENT ILLNESS
[FreeTextEntry1] : Patient presents for followup on fatty liver/T2DM/obesity. Patient is trying to improve her fatty liver/T2DM with lifestyle changes as well as her obesity, is on metformin for type 2 diabetes and Crestor for hyperlipidemia.\par -got covid vaccines X3\par -to pursue thyroid surgery in the future, likely partial thyroidectomy=Dr. Cordero\par - Complaining of increased urinary frequency without dysuria/hematuria/abdominal pain/back pain or fever\par

## 2022-09-11 LAB — BACTERIA UR CULT: NORMAL

## 2022-09-14 ENCOUNTER — NON-APPOINTMENT (OUTPATIENT)
Age: 52
End: 2022-09-14

## 2022-10-13 PROBLEM — Z13.31 SCREENING FOR DEPRESSION: Status: ACTIVE | Noted: 2021-11-04

## 2022-10-28 ENCOUNTER — NON-APPOINTMENT (OUTPATIENT)
Age: 52
End: 2022-10-28

## 2022-10-28 ENCOUNTER — APPOINTMENT (OUTPATIENT)
Dept: CARDIOLOGY | Facility: CLINIC | Age: 52
End: 2022-10-28

## 2022-10-28 VITALS
OXYGEN SATURATION: 98 % | DIASTOLIC BLOOD PRESSURE: 80 MMHG | TEMPERATURE: 98.6 F | HEART RATE: 67 BPM | HEIGHT: 70 IN | BODY MASS INDEX: 34.07 KG/M2 | SYSTOLIC BLOOD PRESSURE: 121 MMHG | WEIGHT: 238 LBS

## 2022-10-28 PROCEDURE — 99213 OFFICE O/P EST LOW 20 MIN: CPT | Mod: 25

## 2022-10-28 PROCEDURE — 93000 ELECTROCARDIOGRAM COMPLETE: CPT

## 2022-10-28 RX ORDER — CETIRIZINE HYDROCHLORIDE 10 MG/1
10 TABLET, FILM COATED ORAL DAILY
Refills: 0 | Status: ACTIVE | COMMUNITY
Start: 2021-07-26

## 2022-10-28 NOTE — ASSESSMENT
[FreeTextEntry1] : \par \par Echo 8/2019- LVEF 55-60%, Mild MR and Mild to Mod TR\par Regular stress test: December 2, 2019: Negative stress test at 103% MPHR, patient exercised for 8 minutes and 30 seconds, achieved 10 METS workload.\par \par EKG 10/22/2021- Sinus  Rhythm  - frequent ectopic ventricular beat s \par # VECs = 2\par -consider old anterior infarct. \par \par CT heart for calcium score done in December 2021: Calcium score 0\par EKG 10/28/2022- Sinus  Rhythm \par -Poor R-wave progression -nonspecific -consider old anterior infarct. \par  -  Nonspecific T-abnormality. \par \par \par Assessment:\par 1. DM/HTN\par 2. Mild MR/Mild to Mod TR\par \par Recommendations:\par Patient has no cardiopulmonary complaints\par \par 1. Continue current medical therapy\par 2. F/u in 1 year\par 3.  Echocardiogram to check LV function and evaluate valvular heart

## 2022-10-28 NOTE — HISTORY OF PRESENT ILLNESS
[FreeTextEntry1] : Patient is a 52-year-old  female who presents for follow-up cardiac consultation .Patient has a history of hyperlipidemia.Patient has  history HTN/ diabetes mellitus, she is a nonsmoker\par \par Today patient presents for f/u.. Patient denies any chest tightness, palpitations or syncope.\par Patient had heart scan for calcium score in 2021 which showed a 0 calcium score

## 2022-10-30 ENCOUNTER — RX RENEWAL (OUTPATIENT)
Age: 52
End: 2022-10-30

## 2022-11-14 ENCOUNTER — APPOINTMENT (OUTPATIENT)
Dept: CARDIOLOGY | Facility: CLINIC | Age: 52
End: 2022-11-14

## 2022-12-05 ENCOUNTER — APPOINTMENT (OUTPATIENT)
Dept: INTERNAL MEDICINE | Facility: CLINIC | Age: 52
End: 2022-12-05

## 2022-12-05 VITALS
HEART RATE: 73 BPM | OXYGEN SATURATION: 98 % | BODY MASS INDEX: 34.07 KG/M2 | DIASTOLIC BLOOD PRESSURE: 80 MMHG | SYSTOLIC BLOOD PRESSURE: 135 MMHG | WEIGHT: 238 LBS | HEIGHT: 70 IN | RESPIRATION RATE: 18 BRPM

## 2022-12-05 DIAGNOSIS — Z92.29 PERSONAL HISTORY OF OTHER DRUG THERAPY: ICD-10-CM

## 2022-12-05 PROCEDURE — G0008: CPT

## 2022-12-05 PROCEDURE — 93000 ELECTROCARDIOGRAM COMPLETE: CPT | Mod: 59

## 2022-12-05 PROCEDURE — 90686 IIV4 VACC NO PRSV 0.5 ML IM: CPT

## 2022-12-05 PROCEDURE — 36415 COLL VENOUS BLD VENIPUNCTURE: CPT

## 2022-12-05 PROCEDURE — 99396 PREV VISIT EST AGE 40-64: CPT | Mod: 25

## 2022-12-05 PROCEDURE — G0444 DEPRESSION SCREEN ANNUAL: CPT | Mod: NC,59

## 2022-12-05 NOTE — COUNSELING
PTOT was ordered.   [Potential consequences of obesity discussed] : Potential consequences of obesity discussed [Benefits of weight loss discussed] : Benefits of weight loss discussed [Structured Weight Management Program suggested:] : Structured weight management program suggested [Encouraged to maintain food diary] : Encouraged to maintain food diary [Encouraged to increase physical activity] : Encouraged to increase physical activity [Target Wt Loss Goal ___] : Weight Loss Goals: Target weight loss goal [unfilled] lbs [Weight management counseling provided] : Weight management [Healthy eating counseling provided] : healthy eating [Activity counseling provided] : activity [Low Fat Diet] : Low fat diet [Decrease Portions] : Decrease food portions [None] : None [Needs reinforcement, provided] : Patient needs reinforcement on understanding lifestyle changes and  the steps needed to achieve self management goals and reinforcement was provided [FreeTextEntry1] : Weight Watchers

## 2022-12-05 NOTE — HEALTH RISK ASSESSMENT
[Fair] : ~his/her~ current health as fair  [Never] : Never [No] : In the past 12 months have you used drugs other than those required for medical reasons? No [No falls in past year] : Patient reported no falls in the past year [0] : 2) Feeling down, depressed, or hopeless: Not at all (0) [None] : None [With Significant Other] : lives with significant other [With Family] : lives with family [# of Members in Household ___] :  household currently consist of [unfilled] member(s) [Employed] : employed [College] : College [] :  [# Of Children ___] : has [unfilled] children [Feels Safe at Home] : Feels safe at home [Fully functional (bathing, dressing, toileting, transferring, walking, feeding)] : Fully functional (bathing, dressing, toileting, transferring, walking, feeding) [Fully functional (using the telephone, shopping, preparing meals, housekeeping, doing laundry, using] : Fully functional and needs no help or supervision to perform IADLs (using the telephone, shopping, preparing meals, housekeeping, doing laundry, using transportation, managing medications and managing finances) [Reports changes in vision] : Reports changes in vision [Smoke Detector] : smoke detector [Carbon Monoxide Detector] : carbon monoxide detector [Guns at Home] : guns at home [Seat Belt] :  uses seat belt [Reviewed no changes] : Reviewed, no changes [Discussed at today's visit] : Advance Directives Discussed at today's visit [Designated Healthcare Proxy] : Designated healthcare proxy [Name: ___] : Health Care Proxy's Name: [unfilled]  [Very Good] : ~his/her~  mood as very good [PHQ-2 Negative - No further assessment needed] : PHQ-2 Negative - No further assessment needed [Audit-CScore] : 0 [de-identified] : no exercise [de-identified] : fair [[id number]] : 0 [Change in mental status noted] : No change in mental status noted [Reports changes in hearing] : Reports no changes in hearing [Reports changes in dental health] : Reports no changes in dental health [Sunscreen] : does not use sunscreen [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

## 2022-12-05 NOTE — HISTORY OF PRESENT ILLNESS
[FreeTextEntry1] : 52-year-old female in for her yearly physical.\par \par Patient with known history of prediabetes with significant increased risk of diabetes secondary to family history and history of gestational diabetes.Unfortunately patient blood work 2019 showed her to be type II diabetic and has been on metformin since.  Last blood work with hemoglobin A1c increased to 10 therefore Jardiance added.\par Still has not made any meaningful lifestyle changes but she has lost 10 pounds.\par \par she is generally feeling well without chest pain, palpitations, shortness of breath or edema \par \par Patient has significant dysfunctional uterine bleeding and ultimately had a robotic assisted hysterectomy October 2015 with resolution of her anemia.\par \par She has a left thyroid nodule for which she was following with endocrinologist Dr. Leahy.She had an FNA biopsy June of 2017= benign.\par Followup thyroid ultrasound July 2020 showed continued growth of dominant left nodule. She had an FNA biopsy with pathology = benign\par More recently left thyroid size has increased and has seen to head and neck surgeons both recommending surgery.  Patient to decide who she will proceed with.\par \par The patient had was a partial torn meniscus of her left knee October 2017.\par For this she saw orthopedic Dr Starkey who referred her for physical therapy which she did not do and her knee has improved.\par \par The patient does have a ventral hernia as well as umbilical hernia for which she has seen surgery who recommended surgery but not until patient loses weight.\par \par Cardiology evaluation 2019 with a negative stress test and echocardiogram Showing mild MR/TR/AZ.\par Tien calcium CAT scan December 2021 score = 0\par \par she did struggle early 2021 with anxiety and depression mainly reaction to her father's death and the death of her Godmother who she was very close to. She is better. Her mother is alive and lives with the patient and things are going okay.\par \par the patient is  with 3 children and works in Level management for [employer]

## 2022-12-05 NOTE — ASSESSMENT
[FreeTextEntry1] : 52-year-old female with type 2 diabetes diagnosed May 2019 continues to need lifestyle changes with diet and exercise.\par Diabetes uncontrolled with patient now on metformin and Jardiance and again told needed lifestyle changes.\par \par Patient blood pressure is good but discussed renal protection using an ACEI/ARB therefore is on ramipril 2.5 mg daily.\par \par  Patient again long discussion and encouraged a low carb diet with regular exercise and resultant weight loss. Goal is for a 10-15 pound weight loss in the next 3 months. Recommended restart Weight Watchers.\par \par Coronary calcium CAT scan December 2021 score = 0\par \par Enlarging left thyroid therefore surgery to be done in the near future\par \par Echocardiogram September 2019\par Mammography May 2021\par CT scan of the chest in May 2022 showing stable nodule\par GYN followup overdue therefore to schedule\par Colonoscopy October 2019 with followup in 10 years\par Ophthalmology UTD\par \par Recieved the CIOVID vaccine influenza vaccine given left deltoid\par continues to decline pneumococcal vaccines\par \par Followup in 3 months\par Venipuncture done today in the office

## 2022-12-05 NOTE — PHYSICAL EXAM
[General Appearance - Alert] : alert [General Appearance - In No Acute Distress] : in no acute distress [Sclera] : the sclera and conjunctiva were normal [PERRL With Normal Accommodation] : pupils were equal in size, round, and reactive to light [Extraocular Movements] : extraocular movements were intact [Outer Ear] : the ears and nose were normal in appearance [Oropharynx] : the oropharynx was normal [Neck Appearance] : the appearance of the neck was normal [Neck Cervical Mass (___cm)] : no neck mass was observed [Jugular Venous Distention Increased] : there was no jugular-venous distention [Thyroid Diffuse Enlargement] : the thyroid was not enlarged [Auscultation Breath Sounds / Voice Sounds] : lungs were clear to auscultation bilaterally [Heart Rate And Rhythm] : heart rate was normal and rhythm regular [Heart Sounds] : normal S1 and S2 [Heart Sounds Gallop] : no gallops [Murmurs] : no murmurs [Heart Sounds Pericardial Friction Rub] : no pericardial rub [Arterial Pulses Carotid] : carotid pulses were normal with no bruits [Abdominal Aorta] : the abdominal aorta was normal [Full Pulse] : the pedal pulses are present [Edema] : there was no peripheral edema [Bowel Sounds] : normal bowel sounds [Abdomen Soft] : soft [Abdomen Tenderness] : non-tender [Abdomen Mass (___ Cm)] : no abdominal mass palpated [Cervical Lymph Nodes Enlarged Posterior Bilaterally] : posterior cervical [Cervical Lymph Nodes Enlarged Anterior Bilaterally] : anterior cervical [Supraclavicular Lymph Nodes Enlarged Bilaterally] : supraclavicular [Axillary Lymph Nodes Enlarged Bilaterally] : axillary [Femoral Lymph Nodes Enlarged Bilaterally] : femoral [Inguinal Lymph Nodes Enlarged Bilaterally] : inguinal [No Spinal Tenderness] : no spinal tenderness [Abnormal Walk] : normal gait [Nail Clubbing] : no clubbing  or cyanosis of the fingernails [Musculoskeletal - Swelling] : no joint swelling seen [Motor Tone] : muscle strength and tone were normal [Skin Color & Pigmentation] : normal skin color and pigmentation [Skin Turgor] : normal skin turgor [] : no rash [Right Foot Was Examined] : right foot was examined [Left Foot Was Examined] : left foot was examined [Normal Appearance] : normal in appearance [Normal in Appearance] : normal in appearance [Normal] : normal [1+] : 1+ in the dorsalis pedis [Cranial Nerves] : cranial nerves 2-12 were intact [No Focal Deficits] : no focal deficits [Oriented To Time, Place, And Person] : oriented to person, place, and time [Impaired Insight] : insight and judgment were intact [Affect] : the affect was normal [#1 Diminished] : number 1 was normal [#2 Diminished] : number 2 was normal [#3 Diminished] : number 3 was normal [#4 Diminished] : number 4 was normal [#5 Diminished] : number 5 was normal [#6 Diminished] : number 6 was normal [#7 Diminished] : number 7 was normal [#8 Diminished] : number 8 was normal [#9 Diminished] : number 9 was normal [#10 Diminished] : number 10 was normal [FreeTextEntry1] : Umbilical keloids [Over the Past 2 Weeks, Have You Felt Down, Depressed, or Hopeless?] : 1.) Over the past 2 weeks, have you felt down, depressed, or hopeless? No [Over the Past 2 Weeks, Have You Felt Little Interest or Pleasure Doing Things?] : 2.) Over the past 2 weeks, have you felt little interest or pleasure doing things? No

## 2022-12-06 LAB
ALBUMIN SERPL ELPH-MCNC: 4.4 G/DL
ALP BLD-CCNC: 87 U/L
ALT SERPL-CCNC: 14 U/L
ANION GAP SERPL CALC-SCNC: 12 MMOL/L
APPEARANCE: CLEAR
AST SERPL-CCNC: 15 U/L
BACTERIA: ABNORMAL
BASOPHILS # BLD AUTO: 0.01 K/UL
BASOPHILS NFR BLD AUTO: 0.2 %
BILIRUB SERPL-MCNC: 0.4 MG/DL
BILIRUBIN URINE: NEGATIVE
BLOOD URINE: NEGATIVE
BUN SERPL-MCNC: 12 MG/DL
CALCIUM SERPL-MCNC: 9.5 MG/DL
CHLORIDE SERPL-SCNC: 102 MMOL/L
CHOLEST SERPL-MCNC: 154 MG/DL
CO2 SERPL-SCNC: 26 MMOL/L
COLOR: NORMAL
CREAT SERPL-MCNC: 0.92 MG/DL
EGFR: 75 ML/MIN/1.73M2
EOSINOPHIL # BLD AUTO: 0.14 K/UL
EOSINOPHIL NFR BLD AUTO: 2.6 %
ESTIMATED AVERAGE GLUCOSE: 197 MG/DL
GLUCOSE QUALITATIVE U: ABNORMAL
GLUCOSE SERPL-MCNC: 122 MG/DL
HBA1C MFR BLD HPLC: 8.5 %
HCT VFR BLD CALC: 42.2 %
HDLC SERPL-MCNC: 44 MG/DL
HGB BLD-MCNC: 13.5 G/DL
HYALINE CASTS: 4 /LPF
IMM GRANULOCYTES NFR BLD AUTO: 0.2 %
KETONES URINE: NEGATIVE
LDLC SERPL CALC-MCNC: 92 MG/DL
LEUKOCYTE ESTERASE URINE: NEGATIVE
LYMPHOCYTES # BLD AUTO: 2.52 K/UL
LYMPHOCYTES NFR BLD AUTO: 47.2 %
MAN DIFF?: NORMAL
MCHC RBC-ENTMCNC: 29.3 PG
MCHC RBC-ENTMCNC: 32 GM/DL
MCV RBC AUTO: 91.7 FL
MICROSCOPIC-UA: NORMAL
MONOCYTES # BLD AUTO: 0.31 K/UL
MONOCYTES NFR BLD AUTO: 5.8 %
NEUTROPHILS # BLD AUTO: 2.35 K/UL
NEUTROPHILS NFR BLD AUTO: 44 %
NITRITE URINE: NEGATIVE
NONHDLC SERPL-MCNC: 110 MG/DL
PH URINE: 5.5
PLATELET # BLD AUTO: 308 K/UL
POTASSIUM SERPL-SCNC: 4.3 MMOL/L
PROT SERPL-MCNC: 7.2 G/DL
PROTEIN URINE: NORMAL
RBC # BLD: 4.6 M/UL
RBC # FLD: 13.1 %
RED BLOOD CELLS URINE: 4 /HPF
SODIUM SERPL-SCNC: 141 MMOL/L
SPECIFIC GRAVITY URINE: 1.03
SQUAMOUS EPITHELIAL CELLS: 5 /HPF
TRIGL SERPL-MCNC: 89 MG/DL
TSH SERPL-ACNC: 0.98 UIU/ML
UROBILINOGEN URINE: NORMAL
WBC # FLD AUTO: 5.34 K/UL
WHITE BLOOD CELLS URINE: 5 /HPF

## 2022-12-07 ENCOUNTER — NON-APPOINTMENT (OUTPATIENT)
Age: 52
End: 2022-12-07

## 2022-12-07 ENCOUNTER — RX RENEWAL (OUTPATIENT)
Age: 52
End: 2022-12-07

## 2022-12-11 ENCOUNTER — NON-APPOINTMENT (OUTPATIENT)
Age: 52
End: 2022-12-11

## 2022-12-12 ENCOUNTER — NON-APPOINTMENT (OUTPATIENT)
Age: 52
End: 2022-12-12

## 2022-12-12 LAB
CREAT SPEC-SCNC: 139 MG/DL
MICROALBUMIN 24H UR DL<=1MG/L-MCNC: 4.4 MG/DL
MICROALBUMIN/CREAT 24H UR-RTO: 31 MG/G

## 2022-12-14 ENCOUNTER — OFFICE (OUTPATIENT)
Dept: URBAN - METROPOLITAN AREA CLINIC 115 | Facility: CLINIC | Age: 52
Setting detail: OPHTHALMOLOGY
End: 2022-12-14
Payer: COMMERCIAL

## 2022-12-14 DIAGNOSIS — E11.9: ICD-10-CM

## 2022-12-14 DIAGNOSIS — H25.13: ICD-10-CM

## 2022-12-14 DIAGNOSIS — H35.033: ICD-10-CM

## 2022-12-14 DIAGNOSIS — H04.123: ICD-10-CM

## 2022-12-14 DIAGNOSIS — H40.1131: ICD-10-CM

## 2022-12-14 PROCEDURE — 92250 FUNDUS PHOTOGRAPHY W/I&R: CPT | Performed by: OPHTHALMOLOGY

## 2022-12-14 PROCEDURE — 92014 COMPRE OPH EXAM EST PT 1/>: CPT | Performed by: OPHTHALMOLOGY

## 2022-12-14 ASSESSMENT — VISUAL ACUITY
OS_BCVA: 20/25
OD_BCVA: 20/20-

## 2022-12-14 ASSESSMENT — PACHYMETRY
OS_CT_UM: 502
OD_CT_UM: 501
OD_CT_CORRECTION: 3
OS_CT_CORRECTION: 3

## 2022-12-14 ASSESSMENT — REFRACTION_CURRENTRX
OS_ADD: +2.00
OS_SPHERE: +0.50
OD_OVR_VA: 20/
OD_AXIS: 138
OD_AXIS: 157
OD_SPHERE: +0.75
OS_OVR_VA: 20/
OS_VPRISM_DIRECTION: PROGS
OD_ADD: +1.25
OS_ADD: +1.50
OD_SPHERE: +0.50
OS_AXIS: 169
OS_AXIS: 090
OD_CYLINDER: -0.50
OS_CYLINDER: -0.25
OD_CYLINDER: -0.25
OS_OVR_VA: 20/
OD_ADD: +1.50
OS_CYLINDER: -0.25
OD_VPRISM_DIRECTION: PROGS
OD_OVR_VA: 20/
OS_SPHERE: +0.75

## 2022-12-14 ASSESSMENT — REFRACTION_MANIFEST
OD_AXIS: 160
OS_SPHERE: +0.75
OS_VA1: 20/20
OD_SPHERE: +0.50
OD_CYLINDER: -0.25
OS_ADD: ++2.25
OD_VA1: 20/20
OD_ADD: ++2.25

## 2022-12-14 ASSESSMENT — TONOMETRY
OD_IOP_MMHG: 18
OS_IOP_MMHG: 18

## 2022-12-14 ASSESSMENT — CONFRONTATIONAL VISUAL FIELD TEST (CVF)
OS_FINDINGS: FULL
OD_FINDINGS: FULL

## 2022-12-14 ASSESSMENT — SUPERFICIAL PUNCTATE KERATITIS (SPK)
OD_SPK: 1+
OS_SPK: 1+

## 2022-12-14 ASSESSMENT — REFRACTION_AUTOREFRACTION
OD_SPHERE: +1.50
OS_SPHERE: +1.50

## 2022-12-14 ASSESSMENT — SPHEQUIV_DERIVED: OD_SPHEQUIV: 0.375

## 2022-12-15 ENCOUNTER — NON-APPOINTMENT (OUTPATIENT)
Age: 52
End: 2022-12-15

## 2022-12-21 ENCOUNTER — APPOINTMENT (OUTPATIENT)
Dept: INTERNAL MEDICINE | Facility: CLINIC | Age: 52
End: 2022-12-21

## 2022-12-21 VITALS
DIASTOLIC BLOOD PRESSURE: 88 MMHG | SYSTOLIC BLOOD PRESSURE: 132 MMHG | TEMPERATURE: 96.3 F | OXYGEN SATURATION: 98 % | HEART RATE: 71 BPM | BODY MASS INDEX: 34.07 KG/M2 | HEIGHT: 70 IN | WEIGHT: 238 LBS

## 2022-12-21 DIAGNOSIS — J06.9 ACUTE UPPER RESPIRATORY INFECTION, UNSPECIFIED: ICD-10-CM

## 2022-12-21 DIAGNOSIS — R09.82 POSTNASAL DRIP: ICD-10-CM

## 2022-12-21 DIAGNOSIS — R05.9 COUGH, UNSPECIFIED: ICD-10-CM

## 2022-12-21 DIAGNOSIS — Z87.898 PERSONAL HISTORY OF OTHER SPECIFIED CONDITIONS: ICD-10-CM

## 2022-12-21 PROCEDURE — 99213 OFFICE O/P EST LOW 20 MIN: CPT

## 2022-12-21 NOTE — PHYSICAL EXAM
[No Acute Distress] : no acute distress [Normal Sclera/Conjunctiva] : normal sclera/conjunctiva [Normal Outer Ear/Nose] : the outer ears and nose were normal in appearance [Normal TMs] : both tympanic membranes were normal [No Lymphadenopathy] : no lymphadenopathy [No Respiratory Distress] : no respiratory distress  [No Accessory Muscle Use] : no accessory muscle use [Clear to Auscultation] : lungs were clear to auscultation bilaterally [Normal Rate] : normal rate  [Regular Rhythm] : with a regular rhythm [No Focal Deficits] : no focal deficits [Normal Affect] : the affect was normal [de-identified] : Not ill-appearing [de-identified] : Slight injection posterior pharynx

## 2022-12-21 NOTE — HISTORY OF PRESENT ILLNESS
[FreeTextEntry8] : The patient presents for an acute visit complaining of 3 to 4 days of not feeling well including head congestion with postnasal drip with associated cough.  2 days ago she had a fever to 101 with chills therefore took Motrin with no further fevers.  Patient continues with symptoms including postnasal drip and cough with fatigue.\par No shortness of breath, chest pain, body aches or GI symptoms.\par COVID testing today which was negative.\par Taking Robitussin-DM.

## 2023-01-05 ENCOUNTER — NON-APPOINTMENT (OUTPATIENT)
Age: 53
End: 2023-01-05

## 2023-01-05 LAB
CREAT 24H UR-MCNC: 1.8 G/24 H
CREAT ?TM UR-MCNC: 135 MG/DL
PROT 24H UR-MRATE: 11 MG/DL
PROT ?TM UR-MCNC: 24 HR
PROT UR-MCNC: 143 MG/24 H
SPECIMEN VOL 24H UR: 1300 ML

## 2023-01-13 ENCOUNTER — NON-APPOINTMENT (OUTPATIENT)
Age: 53
End: 2023-01-13

## 2023-01-13 ENCOUNTER — APPOINTMENT (OUTPATIENT)
Dept: OBGYN | Facility: CLINIC | Age: 53
End: 2023-01-13
Payer: COMMERCIAL

## 2023-01-13 VITALS
HEIGHT: 70 IN | DIASTOLIC BLOOD PRESSURE: 84 MMHG | BODY MASS INDEX: 33.77 KG/M2 | SYSTOLIC BLOOD PRESSURE: 122 MMHG | WEIGHT: 235.9 LBS

## 2023-01-13 DIAGNOSIS — Z01.419 ENCOUNTER FOR GYNECOLOGICAL EXAMINATION (GENERAL) (ROUTINE) W/OUT ABNORMAL FINDINGS: ICD-10-CM

## 2023-01-13 PROCEDURE — 99386 PREV VISIT NEW AGE 40-64: CPT

## 2023-01-13 NOTE — PHYSICAL EXAM
[Chaperone Present] : A chaperone was present in the examining room during all aspects of the physical examination [FreeTextEntry1] : Mray Ellen [Appropriately responsive] : appropriately responsive [Alert] : alert [No Acute Distress] : no acute distress [No Lymphadenopathy] : no lymphadenopathy [Regular Rate Rhythm] : regular rate rhythm [No Murmurs] : no murmurs [Clear to Auscultation B/L] : clear to auscultation bilaterally [Soft] : soft [Non-tender] : non-tender [Non-distended] : non-distended [No HSM] : No HSM [No Lesions] : no lesions [No Mass] : no mass [Oriented x3] : oriented x3 [Examination Of The Breasts] : a normal appearance [No Masses] : no breast masses were palpable [Labia Majora] : normal [Labia Minora] : normal [Normal] : normal [Absent] : absent [Uterine Adnexae] : normal

## 2023-01-13 NOTE — PHYSICAL EXAM
[Chaperone Present] : A chaperone was present in the examining room during all aspects of the physical examination [FreeTextEntry1] : Mary Ellen [Appropriately responsive] : appropriately responsive [Alert] : alert [No Acute Distress] : no acute distress [No Lymphadenopathy] : no lymphadenopathy [Regular Rate Rhythm] : regular rate rhythm [No Murmurs] : no murmurs [Clear to Auscultation B/L] : clear to auscultation bilaterally [Soft] : soft [Non-tender] : non-tender [Non-distended] : non-distended [No HSM] : No HSM [No Lesions] : no lesions [No Mass] : no mass [Oriented x3] : oriented x3 [Examination Of The Breasts] : a normal appearance [No Masses] : no breast masses were palpable [Labia Majora] : normal [Labia Minora] : normal [Normal] : normal [Absent] : absent [Uterine Adnexae] : normal

## 2023-01-13 NOTE — HISTORY OF PRESENT ILLNESS
[Hot Flashes] : hot flashes [Night Sweats] : night sweats [3/10] : described as s 3/10 in severity [Weight Change] : weight change [Hot Environment] : exacerbated by hot environment [Cool Environment] : relieved by cool environment [Stress] : exacerbated by stress [Relaxation] : relieved by relaxation [Vaginal Itching] : no vaginal itching [Dyspareunia] : no dyspareunia [Mood Changes] : no mood changes [Headache] : no headache [Fatigue] : no fatigue [Irritability] : no irritability [Forgetfulness] : no forgetfulness [Loss of Libido] : no loss of libido [Depression] : no depression [Anxiety] : no anxiety [Alcohol Intake] : not exacerbated by alcohol intake [Exercise] : not relieved by exercise [Hormonal Therapy] : not relieved by hormonal therapy [Sleep Medications] : not relieved by sleep medications [Anticholinergics] : not relieved by anticholinergics

## 2023-01-13 NOTE — PHYSICAL EXAM
[Awake] : awake [Alert] : alert [Soft] : soft [Oriented x3] : oriented to person, place, and time [Normal] : urethra [No Bleeding] : there was no active vaginal bleeding [Absent] : absent [Uterine Adnexae] : were not tender and not enlarged [Acute Distress] : no acute distress [Mass] : no breast mass [Nipple Discharge] : no nipple discharge [Axillary LAD] : no axillary lymphadenopathy [Tender] : non tender

## 2023-01-14 LAB
C TRACH RRNA SPEC QL NAA+PROBE: NOT DETECTED
HPV HIGH+LOW RISK DNA PNL CVX: NOT DETECTED
N GONORRHOEA RRNA SPEC QL NAA+PROBE: NOT DETECTED
SOURCE TP AMPLIFICATION: NORMAL

## 2023-01-18 ENCOUNTER — APPOINTMENT (OUTPATIENT)
Dept: INTERNAL MEDICINE | Facility: CLINIC | Age: 53
End: 2023-01-18
Payer: COMMERCIAL

## 2023-01-18 VITALS
SYSTOLIC BLOOD PRESSURE: 122 MMHG | HEART RATE: 63 BPM | WEIGHT: 236 LBS | RESPIRATION RATE: 13 BRPM | DIASTOLIC BLOOD PRESSURE: 80 MMHG | HEIGHT: 70 IN | OXYGEN SATURATION: 98 % | BODY MASS INDEX: 33.79 KG/M2

## 2023-01-18 DIAGNOSIS — F41.8 OTHER SPECIFIED ANXIETY DISORDERS: ICD-10-CM

## 2023-01-18 DIAGNOSIS — Z02.89 ENCOUNTER FOR OTHER ADMINISTRATIVE EXAMINATIONS: ICD-10-CM

## 2023-01-18 DIAGNOSIS — Z79.899 OTHER LONG TERM (CURRENT) DRUG THERAPY: ICD-10-CM

## 2023-01-18 DIAGNOSIS — U07.1 COVID-19: ICD-10-CM

## 2023-01-18 DIAGNOSIS — Z01.818 ENCOUNTER FOR OTHER PREPROCEDURAL EXAMINATION: ICD-10-CM

## 2023-01-18 DIAGNOSIS — Z23 ENCOUNTER FOR IMMUNIZATION: ICD-10-CM

## 2023-01-18 DIAGNOSIS — E11.65 TYPE 2 DIABETES MELLITUS WITH HYPERGLYCEMIA: ICD-10-CM

## 2023-01-18 DIAGNOSIS — J06.9 ACUTE UPPER RESPIRATORY INFECTION, UNSPECIFIED: ICD-10-CM

## 2023-01-18 DIAGNOSIS — Z87.898 PERSONAL HISTORY OF OTHER SPECIFIED CONDITIONS: ICD-10-CM

## 2023-01-18 PROCEDURE — 99213 OFFICE O/P EST LOW 20 MIN: CPT

## 2023-01-19 ENCOUNTER — APPOINTMENT (OUTPATIENT)
Dept: CARDIOLOGY | Facility: CLINIC | Age: 53
End: 2023-01-19
Payer: COMMERCIAL

## 2023-01-19 LAB — CYTOLOGY CVX/VAG DOC THIN PREP: NORMAL

## 2023-01-19 PROCEDURE — 93306 TTE W/DOPPLER COMPLETE: CPT

## 2023-01-20 NOTE — ASSESSMENT
[Patient Optimized for Surgery] : Patient optimized for surgery [Cardiology consultation] : Cardiology consultation [Modify medications prior to procedure] : Modify medications prior to procedure [As per surgery] : as per surgery [FreeTextEntry4] : 52-year-old female currently stable with no contraindication to planned left hemithyroidectomy.\par Patient with dyspnea on exertion likely secondary to deconditioning but with increased risks we will have patient get preoperative cardiac evaluation. [FreeTextEntry7] : No diabetic medications the morning of the procedure

## 2023-01-20 NOTE — RESULTS/DATA
[] : results reviewed [de-identified] : WNL [de-identified] : WNL [de-identified] : WNL [de-identified] : NSR, DIMASL [de-identified] : Hemoglobin A1c = 6.9

## 2023-01-20 NOTE — HISTORY OF PRESENT ILLNESS
[No Pertinent Cardiac History] : no history of aortic stenosis, atrial fibrillation, coronary artery disease, recent myocardial infarction, or implantable device/pacemaker [No Pertinent Pulmonary History] : no history of asthma, COPD, sleep apnea, or smoking [No Adverse Anesthesia Reaction] : no adverse anesthesia reaction in self or family member [Diabetes] : diabetes [(Patient denies any chest pain, claudication, dyspnea on exertion, orthopnea, palpitations or syncope)] : Patient denies any chest pain, claudication, dyspnea on exertion, orthopnea, palpitations or syncope [Moderate (4-6 METs)] : Moderate (4-6 METs) [Chronic Anticoagulation] : no chronic anticoagulation [Chronic Kidney Disease] : no chronic kidney disease [FreeTextEntry1] : Left hemithyroidectomy [FreeTextEntry2] : February 2, 2023 [FreeTextEntry4] : 52-year-old female presents for medical evaluation prior to left hemithyroidectomy.\par \par Medical history includes type 2 diabetes on metformin and Jardiance, hyperlipidemia on rosuvastatin and on ramipril for renal protection.  No history of hypertension.\par \par No cardiopulmonary, hepatorenal, hematological or cancer history.\par \par Patient generally feeling well with no chest pain, palpitations, edema, cough, fever or chills.\par She does complain of dyspnea on exertion which is stable.  No associated chest pain.

## 2023-01-23 ENCOUNTER — APPOINTMENT (OUTPATIENT)
Dept: CARDIOLOGY | Facility: CLINIC | Age: 53
End: 2023-01-23
Payer: COMMERCIAL

## 2023-01-23 ENCOUNTER — NON-APPOINTMENT (OUTPATIENT)
Age: 53
End: 2023-01-23

## 2023-01-23 VITALS
OXYGEN SATURATION: 98 % | WEIGHT: 242 LBS | TEMPERATURE: 98.3 F | HEIGHT: 70 IN | DIASTOLIC BLOOD PRESSURE: 70 MMHG | SYSTOLIC BLOOD PRESSURE: 120 MMHG | HEART RATE: 73 BPM | BODY MASS INDEX: 34.65 KG/M2

## 2023-01-23 DIAGNOSIS — Z01.810 ENCOUNTER FOR PREPROCEDURAL CARDIOVASCULAR EXAMINATION: ICD-10-CM

## 2023-01-23 PROCEDURE — 99214 OFFICE O/P EST MOD 30 MIN: CPT | Mod: 25

## 2023-01-23 PROCEDURE — 93000 ELECTROCARDIOGRAM COMPLETE: CPT | Mod: NC

## 2023-01-31 ENCOUNTER — RX RENEWAL (OUTPATIENT)
Age: 53
End: 2023-01-31

## 2023-03-27 ENCOUNTER — APPOINTMENT (OUTPATIENT)
Dept: INTERNAL MEDICINE | Facility: CLINIC | Age: 53
End: 2023-03-27
Payer: COMMERCIAL

## 2023-03-27 VITALS
WEIGHT: 239 LBS | BODY MASS INDEX: 34.29 KG/M2 | SYSTOLIC BLOOD PRESSURE: 125 MMHG | DIASTOLIC BLOOD PRESSURE: 85 MMHG | RESPIRATION RATE: 14 BRPM | HEART RATE: 70 BPM

## 2023-03-27 PROCEDURE — 36415 COLL VENOUS BLD VENIPUNCTURE: CPT

## 2023-03-27 PROCEDURE — 99214 OFFICE O/P EST MOD 30 MIN: CPT | Mod: 25

## 2023-03-27 NOTE — ASSESSMENT
[FreeTextEntry1] : Venipuncture done in our office, Labs sent out and further recommendations will be made based on lab results.Patient continues to need lifestyle changes with weight loss. Patient will return to office in 3-4months\par \par echo was 1/2023\par Thyroid sonogram= via specialist\par Mammogram was 5/2022=rx given\par specialists include\par 1. GYN=Dr. Storey\par 2. Endocrinology-Dr. Leahy-prn\par 3.hematology-Dr. Junior-declines follow up\par 4. Orthopedic-Dr. Starkey-no need for follow up"knee is fine now"\par 5.ophthalmology-Dr. Reyes-12/2022\par 6.Surgeon-Dr. Esquivel\par 7.Cardio-Dr. Mancia\par 8.Head and neck MD-Dr. Ortega @ [facility]\par declines pneumococcal vaccine/shingrix vaccines but +got covid vaccines X3\par colonoscopy-10/2019-next in 10years\par stress test 12/2019\par Microalbumin 12/2022\par 24 hour urine 1/2023\par CT calcium score = 0=12/2021= incidental pulmonary nodule seen= CT chest=5/2022=rx given

## 2023-03-27 NOTE — HISTORY OF PRESENT ILLNESS
[FreeTextEntry1] : Patient presents for followup on fatty liver/T2DM/obesity. Patient is trying to improve her fatty liver/T2DM with lifestyle changes as well as her obesity, is on metformin for type 2 diabetes and Crestor for hyperlipidemia.\par -got covid vaccines X3\par -Status post left hemithyroidectomy 2/2023, path=benign per pt\par

## 2023-03-28 ENCOUNTER — NON-APPOINTMENT (OUTPATIENT)
Age: 53
End: 2023-03-28

## 2023-03-28 LAB
ALBUMIN SERPL ELPH-MCNC: 4.5 G/DL
ALP BLD-CCNC: 92 U/L
ALT SERPL-CCNC: 12 U/L
ANION GAP SERPL CALC-SCNC: 15 MMOL/L
AST SERPL-CCNC: 16 U/L
BASOPHILS # BLD AUTO: 0.01 K/UL
BASOPHILS NFR BLD AUTO: 0.2 %
BILIRUB SERPL-MCNC: 0.3 MG/DL
BUN SERPL-MCNC: 18 MG/DL
CALCIUM SERPL-MCNC: 9.5 MG/DL
CHLORIDE SERPL-SCNC: 101 MMOL/L
CHOLEST SERPL-MCNC: 170 MG/DL
CO2 SERPL-SCNC: 23 MMOL/L
CREAT SERPL-MCNC: 0.86 MG/DL
EGFR: 81 ML/MIN/1.73M2
EOSINOPHIL # BLD AUTO: 0.18 K/UL
EOSINOPHIL NFR BLD AUTO: 3 %
ESTIMATED AVERAGE GLUCOSE: 143 MG/DL
GLUCOSE SERPL-MCNC: 104 MG/DL
HBA1C MFR BLD HPLC: 6.6 %
HCT VFR BLD CALC: 42.5 %
HDLC SERPL-MCNC: 55 MG/DL
HGB BLD-MCNC: 13.3 G/DL
IMM GRANULOCYTES NFR BLD AUTO: 0.2 %
LDLC SERPL CALC-MCNC: 102 MG/DL
LYMPHOCYTES # BLD AUTO: 2.69 K/UL
LYMPHOCYTES NFR BLD AUTO: 44.8 %
MAN DIFF?: NORMAL
MCHC RBC-ENTMCNC: 29 PG
MCHC RBC-ENTMCNC: 31.3 GM/DL
MCV RBC AUTO: 92.8 FL
MONOCYTES # BLD AUTO: 0.39 K/UL
MONOCYTES NFR BLD AUTO: 6.5 %
NEUTROPHILS # BLD AUTO: 2.73 K/UL
NEUTROPHILS NFR BLD AUTO: 45.3 %
NONHDLC SERPL-MCNC: 116 MG/DL
PLATELET # BLD AUTO: 328 K/UL
POTASSIUM SERPL-SCNC: 4.4 MMOL/L
PROT SERPL-MCNC: 7 G/DL
RBC # BLD: 4.58 M/UL
RBC # FLD: 13.8 %
SODIUM SERPL-SCNC: 139 MMOL/L
T4 FREE SERPL-MCNC: 1.2 NG/DL
TRIGL SERPL-MCNC: 67 MG/DL
TSH SERPL-ACNC: 3.43 UIU/ML
WBC # FLD AUTO: 6.01 K/UL

## 2023-05-01 ENCOUNTER — RX RENEWAL (OUTPATIENT)
Age: 53
End: 2023-05-01

## 2023-05-05 ENCOUNTER — APPOINTMENT (OUTPATIENT)
Dept: INTERNAL MEDICINE | Facility: CLINIC | Age: 53
End: 2023-05-05
Payer: COMMERCIAL

## 2023-05-05 VITALS
RESPIRATION RATE: 13 BRPM | BODY MASS INDEX: 35.22 KG/M2 | OXYGEN SATURATION: 95 % | DIASTOLIC BLOOD PRESSURE: 82 MMHG | HEART RATE: 86 BPM | WEIGHT: 246 LBS | HEIGHT: 70 IN | SYSTOLIC BLOOD PRESSURE: 130 MMHG

## 2023-05-05 DIAGNOSIS — H66.90 OTITIS MEDIA, UNSPECIFIED, UNSPECIFIED EAR: ICD-10-CM

## 2023-05-05 DIAGNOSIS — H60.90 UNSPECIFIED OTITIS EXTERNA, UNSPECIFIED EAR: ICD-10-CM

## 2023-05-05 PROCEDURE — 99213 OFFICE O/P EST LOW 20 MIN: CPT

## 2023-05-05 RX ORDER — AMOXICILLIN AND CLAVULANATE POTASSIUM 875; 125 MG/1; MG/1
875-125 TABLET, COATED ORAL
Qty: 20 | Refills: 0 | Status: COMPLETED | COMMUNITY
Start: 2023-05-05 | End: 2023-05-15

## 2023-05-07 NOTE — ASSESSMENT
[FreeTextEntry1] : Augmentin x10 days/Cortisporin otic given.  Tylenol advised.  Patient will call if symptoms continue or worsen and return to the office as scheduled for regular follow-up\par \par \par Attending MD available by phone if needed

## 2023-05-07 NOTE — PHYSICAL EXAM
[No Acute Distress] : no acute distress [No Respiratory Distress] : no respiratory distress  [Clear to Auscultation] : lungs were clear to auscultation bilaterally [Normal Rate] : normal rate  [Regular Rhythm] : with a regular rhythm [Normal Affect] : the affect was normal [Normal Mood] : the mood was normal [Normal Oropharynx] : the oropharynx was normal [Normal Nasal Mucosa] : the nasal mucosa was normal [Supple] : supple [de-identified] : Right TM is normal,+ hyperemic/ bulge left TM with +tragal and no pinnal pain [de-identified] : Not tender over TMJ joint

## 2023-05-07 NOTE — HISTORY OF PRESENT ILLNESS
[FreeTextEntry8] : Patient presents stating last night she started with terrible left ear pain that continues today.  Patient states she feels it inside her ear and also at her left tragus.  Patient states the right ear is normal.  Patient has no associated sick symptoms including sore throat/fever/cough.  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

## 2023-05-09 NOTE — COUNSELING
[Potential consequences of obesity discussed] : Potential consequences of obesity discussed [Benefits of weight loss discussed] : Benefits of weight loss discussed [Structured Weight Management Program suggested:] : Structured weight management program suggested [Encouraged to maintain food diary] : Encouraged to maintain food diary [Encouraged to increase physical activity] : Encouraged to increase physical activity [Target Wt Loss Goal ___] : Weight Loss Goals: Target weight loss goal [unfilled] lbs [Weight management counseling provided] : Weight management [Healthy eating counseling provided] : healthy eating [Activity counseling provided] : activity [Low Fat Diet] : Low fat diet [Decrease Portions] : Decrease food portions [None] : None [Needs reinforcement, provided] : Patient needs reinforcement on understanding lifestyle changes and  the steps needed to achieve self management goals and reinforcement was provided [FreeTextEntry1] : Weight Watchers no

## 2023-06-01 ENCOUNTER — OUTPATIENT (OUTPATIENT)
Dept: OUTPATIENT SERVICES | Facility: HOSPITAL | Age: 53
LOS: 1 days | End: 2023-06-01
Payer: COMMERCIAL

## 2023-06-01 ENCOUNTER — APPOINTMENT (OUTPATIENT)
Dept: CT IMAGING | Facility: CLINIC | Age: 53
End: 2023-06-01
Payer: COMMERCIAL

## 2023-06-01 DIAGNOSIS — N84.0 POLYP OF CORPUS UTERI: Chronic | ICD-10-CM

## 2023-06-01 DIAGNOSIS — R91.1 SOLITARY PULMONARY NODULE: ICD-10-CM

## 2023-06-01 PROCEDURE — 71250 CT THORAX DX C-: CPT | Mod: 26

## 2023-06-01 PROCEDURE — 71250 CT THORAX DX C-: CPT

## 2023-06-05 ENCOUNTER — RX RENEWAL (OUTPATIENT)
Age: 53
End: 2023-06-05

## 2023-06-12 ENCOUNTER — NON-APPOINTMENT (OUTPATIENT)
Age: 53
End: 2023-06-12

## 2023-06-14 ENCOUNTER — OFFICE (OUTPATIENT)
Dept: URBAN - METROPOLITAN AREA CLINIC 115 | Facility: CLINIC | Age: 53
Setting detail: OPHTHALMOLOGY
End: 2023-06-14
Payer: COMMERCIAL

## 2023-06-14 DIAGNOSIS — H04.123: ICD-10-CM

## 2023-06-14 DIAGNOSIS — H40.1131: ICD-10-CM

## 2023-06-14 DIAGNOSIS — E11.9: ICD-10-CM

## 2023-06-14 DIAGNOSIS — H35.033: ICD-10-CM

## 2023-06-14 DIAGNOSIS — H25.13: ICD-10-CM

## 2023-06-14 PROCEDURE — 92133 CPTRZD OPH DX IMG PST SGM ON: CPT | Performed by: OPHTHALMOLOGY

## 2023-06-14 PROCEDURE — 92020 GONIOSCOPY: CPT | Performed by: OPHTHALMOLOGY

## 2023-06-14 PROCEDURE — 92083 EXTENDED VISUAL FIELD XM: CPT | Performed by: OPHTHALMOLOGY

## 2023-06-14 PROCEDURE — 92014 COMPRE OPH EXAM EST PT 1/>: CPT | Performed by: OPHTHALMOLOGY

## 2023-06-14 ASSESSMENT — PACHYMETRY
OD_CT_UM: 501
OS_CT_UM: 502
OD_CT_CORRECTION: 3
OS_CT_CORRECTION: 3

## 2023-06-14 ASSESSMENT — SPHEQUIV_DERIVED
OD_SPHEQUIV: 0.375
OS_SPHEQUIV: 1.25
OD_SPHEQUIV: 1.5

## 2023-06-14 ASSESSMENT — REFRACTION_CURRENTRX
OS_AXIS: 169
OS_ADD: +2.00
OS_OVR_VA: 20/
OD_OVR_VA: 20/
OS_VPRISM_DIRECTION: PROGS
OD_CYLINDER: -0.50
OD_ADD: +1.50
OD_AXIS: 157
OS_AXIS: 090
OS_OVR_VA: 20/
OD_AXIS: 138
OD_CYLINDER: -0.25
OD_VPRISM_DIRECTION: PROGS
OS_CYLINDER: -0.25
OS_SPHERE: +0.75
OS_CYLINDER: -0.25
OD_SPHERE: +0.50
OD_ADD: +1.25
OS_SPHERE: +0.50
OD_OVR_VA: 20/
OS_ADD: +1.50
OD_SPHERE: +0.75

## 2023-06-14 ASSESSMENT — VISUAL ACUITY
OD_BCVA: 20/30-
OS_BCVA: 20/30

## 2023-06-14 ASSESSMENT — REFRACTION_AUTOREFRACTION
OD_AXIS: 179
OS_SPHERE: +1.50
OD_CYLINDER: -0.50
OS_CYLINDER: -0.50
OD_SPHERE: +1.75
OS_AXIS: 169

## 2023-06-14 ASSESSMENT — TONOMETRY
OD_IOP_MMHG: 20
OS_IOP_MMHG: 18
OS_IOP_MMHG: 17
OD_IOP_MMHG: 18

## 2023-06-14 ASSESSMENT — CONFRONTATIONAL VISUAL FIELD TEST (CVF)
OS_FINDINGS: FULL
OD_FINDINGS: FULL

## 2023-06-14 ASSESSMENT — REFRACTION_MANIFEST
OD_VA1: 20/20
OS_ADD: ++2.25
OD_AXIS: 160
OD_CYLINDER: -0.25
OS_SPHERE: +0.75
OD_SPHERE: +0.50
OD_ADD: ++2.25
OS_VA1: 20/20

## 2023-06-14 ASSESSMENT — SUPERFICIAL PUNCTATE KERATITIS (SPK)
OD_SPK: 1+
OS_SPK: 1+

## 2023-06-15 ENCOUNTER — NON-APPOINTMENT (OUTPATIENT)
Age: 53
End: 2023-06-15

## 2023-06-29 RX ORDER — COLISTIN SULFATE, NEOMYCIN SULFATE, THONZONIUM BROMIDE AND HYDROCORTISONE ACETATE 3; 3.3; .5; 1 MG/ML; MG/ML; MG/ML; MG/ML
3.3-3-10-0.5 SUSPENSION AURICULAR (OTIC) 4 TIMES DAILY
Qty: 1 | Refills: 0 | Status: DISCONTINUED | COMMUNITY
Start: 2023-05-05 | End: 2023-06-29

## 2023-06-30 ENCOUNTER — NON-APPOINTMENT (OUTPATIENT)
Age: 53
End: 2023-06-30

## 2023-06-30 ENCOUNTER — APPOINTMENT (OUTPATIENT)
Dept: INTERNAL MEDICINE | Facility: CLINIC | Age: 53
End: 2023-06-30
Payer: COMMERCIAL

## 2023-06-30 VITALS
RESPIRATION RATE: 13 BRPM | BODY MASS INDEX: 35.36 KG/M2 | OXYGEN SATURATION: 98 % | HEART RATE: 88 BPM | SYSTOLIC BLOOD PRESSURE: 125 MMHG | WEIGHT: 247 LBS | DIASTOLIC BLOOD PRESSURE: 80 MMHG | HEIGHT: 70 IN

## 2023-06-30 PROCEDURE — 99214 OFFICE O/P EST MOD 30 MIN: CPT | Mod: 25

## 2023-06-30 PROCEDURE — 36415 COLL VENOUS BLD VENIPUNCTURE: CPT

## 2023-07-01 LAB
ALBUMIN SERPL ELPH-MCNC: 4.4 G/DL
ALP BLD-CCNC: 90 U/L
ALT SERPL-CCNC: 20 U/L
ANION GAP SERPL CALC-SCNC: 15 MMOL/L
AST SERPL-CCNC: 17 U/L
BILIRUB SERPL-MCNC: 0.3 MG/DL
BUN SERPL-MCNC: 16 MG/DL
CALCIUM SERPL-MCNC: 9.6 MG/DL
CHLORIDE SERPL-SCNC: 102 MMOL/L
CHOLEST SERPL-MCNC: 143 MG/DL
CO2 SERPL-SCNC: 23 MMOL/L
CREAT SERPL-MCNC: 0.95 MG/DL
EGFR: 72 ML/MIN/1.73M2
ESTIMATED AVERAGE GLUCOSE: 157 MG/DL
GLUCOSE SERPL-MCNC: 138 MG/DL
HBA1C MFR BLD HPLC: 7.1 %
HDLC SERPL-MCNC: 49 MG/DL
LDLC SERPL CALC-MCNC: 74 MG/DL
NONHDLC SERPL-MCNC: 94 MG/DL
POTASSIUM SERPL-SCNC: 4.4 MMOL/L
PROT SERPL-MCNC: 7 G/DL
SODIUM SERPL-SCNC: 140 MMOL/L
T4 FREE SERPL-MCNC: 1.2 NG/DL
TRIGL SERPL-MCNC: 100 MG/DL
TSH SERPL-ACNC: 3.24 UIU/ML

## 2023-07-01 NOTE — ASSESSMENT
[FreeTextEntry1] : Venipuncture done in our office, Labs sent out and further recommendations will be made based on lab results.Patient continues to need lifestyle changes with weight loss. Patient will return to office in 3-4months\par \par echo was 1/2023\par Thyroid sonogram= via specialist\par Mammogram was 5/2022="to do"= referral given\par specialists include\par 1. GYN=Dr. Storey\par 2. Endocrinology-Dr. Leahy-prn\par 3.hematology-Dr. Junior-declines follow up\par 4. Orthopedic-Dr. Starkey-no need for follow up"knee is fine now"\par 5.ophthalmology-Dr. Reyes-6/2023\par 6.Surgeon-Dr. Esquivel\par 7.Cardio-Dr. Mancia\par 8.Head and neck MD-Dr. Ortega @ [facility]\par declines pneumococcal vaccine/shingrix vaccines but +got covid vaccines X3\par colonoscopy-10/2019-next in 10years\par stress test 12/2019\par Microalbumin 12/2022\par 24 hour urine 1/2023\par CT calcium score = 0=12/2021= incidental pulmonary nodule seen= CT chest=6/2023

## 2023-07-17 ENCOUNTER — APPOINTMENT (OUTPATIENT)
Dept: INTERNAL MEDICINE | Facility: CLINIC | Age: 53
End: 2023-07-17
Payer: COMMERCIAL

## 2023-07-17 DIAGNOSIS — Z71.89 OTHER SPECIFIED COUNSELING: ICD-10-CM

## 2023-07-17 DIAGNOSIS — U07.1 COVID-19: ICD-10-CM

## 2023-07-17 PROCEDURE — 99441: CPT

## 2023-07-18 NOTE — HISTORY OF PRESENT ILLNESS
[Home] : at home, [unfilled] , at the time of the visit. [Medical Office: ([facility])___] : at the medical office located in  [Verbal consent obtained from patient] : the patient, [unfilled] [FreeTextEntry1] : Patient was diagnosed with COVID on 7/15/23\par Patient's symptoms include fatigue, runny nose, headache, cough with max temperature of 102.3, currently afebrile.  Patient denies dyspnea.  Patient has taken OTC medication, feels better today\par Patient's symptoms started on 7 /15/23\par Vaccine status=+X3\par \par COVID discussed with patient and questions answered\par \par Recommendations\par -Rest/fluids/quarantine/supportive therapy\par -Report any negative symptoms\par -Antiviral discussed but pt defers at this time as symptoms have improved, to call if she feels she needs treatment\par \par DX code-U07.1/Z71.89\par Time on the phone =5minutes

## 2023-07-19 ENCOUNTER — NON-APPOINTMENT (OUTPATIENT)
Age: 53
End: 2023-07-19

## 2023-08-08 ENCOUNTER — RX RENEWAL (OUTPATIENT)
Age: 53
End: 2023-08-08

## 2023-09-22 ENCOUNTER — APPOINTMENT (OUTPATIENT)
Dept: INTERNAL MEDICINE | Facility: CLINIC | Age: 53
End: 2023-09-22
Payer: COMMERCIAL

## 2023-09-22 VITALS
WEIGHT: 246 LBS | OXYGEN SATURATION: 95 % | RESPIRATION RATE: 14 BRPM | BODY MASS INDEX: 35.22 KG/M2 | SYSTOLIC BLOOD PRESSURE: 125 MMHG | HEIGHT: 70 IN | DIASTOLIC BLOOD PRESSURE: 80 MMHG | HEART RATE: 71 BPM

## 2023-09-22 DIAGNOSIS — Z23 ENCOUNTER FOR IMMUNIZATION: ICD-10-CM

## 2023-09-22 DIAGNOSIS — E04.1 NONTOXIC SINGLE THYROID NODULE: ICD-10-CM

## 2023-09-22 DIAGNOSIS — M79.671 PAIN IN RIGHT FOOT: ICD-10-CM

## 2023-09-22 PROCEDURE — 90715 TDAP VACCINE 7 YRS/> IM: CPT

## 2023-09-22 PROCEDURE — 99214 OFFICE O/P EST MOD 30 MIN: CPT | Mod: 25

## 2023-09-22 PROCEDURE — 90686 IIV4 VACC NO PRSV 0.5 ML IM: CPT

## 2023-09-22 PROCEDURE — G0008: CPT | Mod: 59

## 2023-09-22 PROCEDURE — 36415 COLL VENOUS BLD VENIPUNCTURE: CPT

## 2023-09-22 PROCEDURE — 90472 IMMUNIZATION ADMIN EACH ADD: CPT

## 2023-09-23 LAB
ALBUMIN SERPL ELPH-MCNC: 4.6 G/DL
ALP BLD-CCNC: 95 U/L
ALT SERPL-CCNC: 24 U/L
ANION GAP SERPL CALC-SCNC: 15 MMOL/L
AST SERPL-CCNC: 18 U/L
BASOPHILS # BLD AUTO: 0.01 K/UL
BASOPHILS NFR BLD AUTO: 0.2 %
BILIRUB SERPL-MCNC: 0.4 MG/DL
BUN SERPL-MCNC: 14 MG/DL
CALCIUM SERPL-MCNC: 9.8 MG/DL
CHLORIDE SERPL-SCNC: 102 MMOL/L
CHOLEST SERPL-MCNC: 165 MG/DL
CO2 SERPL-SCNC: 25 MMOL/L
CREAT SERPL-MCNC: 0.75 MG/DL
EGFR: 95 ML/MIN/1.73M2
EOSINOPHIL # BLD AUTO: 0.14 K/UL
EOSINOPHIL NFR BLD AUTO: 2.5 %
ESTIMATED AVERAGE GLUCOSE: 160 MG/DL
GLUCOSE SERPL-MCNC: 123 MG/DL
HBA1C MFR BLD HPLC: 7.2 %
HCT VFR BLD CALC: 44.9 %
HDLC SERPL-MCNC: 51 MG/DL
HGB BLD-MCNC: 14.2 G/DL
IMM GRANULOCYTES NFR BLD AUTO: 0.2 %
LDLC SERPL CALC-MCNC: 96 MG/DL
LYMPHOCYTES # BLD AUTO: 2.36 K/UL
LYMPHOCYTES NFR BLD AUTO: 42.8 %
MAN DIFF?: NORMAL
MCHC RBC-ENTMCNC: 29.3 PG
MCHC RBC-ENTMCNC: 31.6 GM/DL
MCV RBC AUTO: 92.8 FL
MONOCYTES # BLD AUTO: 0.35 K/UL
MONOCYTES NFR BLD AUTO: 6.3 %
NEUTROPHILS # BLD AUTO: 2.65 K/UL
NEUTROPHILS NFR BLD AUTO: 48 %
NONHDLC SERPL-MCNC: 114 MG/DL
PLATELET # BLD AUTO: 305 K/UL
POTASSIUM SERPL-SCNC: 4.4 MMOL/L
PROT SERPL-MCNC: 7.2 G/DL
RBC # BLD: 4.84 M/UL
RBC # FLD: 13.2 %
SODIUM SERPL-SCNC: 142 MMOL/L
T4 FREE SERPL-MCNC: 1.1 NG/DL
TRIGL SERPL-MCNC: 101 MG/DL
TSH SERPL-ACNC: 1.71 UIU/ML
WBC # FLD AUTO: 5.52 K/UL

## 2023-10-02 ENCOUNTER — RX RENEWAL (OUTPATIENT)
Age: 53
End: 2023-10-02

## 2023-10-16 ENCOUNTER — NON-APPOINTMENT (OUTPATIENT)
Age: 53
End: 2023-10-16

## 2023-10-16 ENCOUNTER — APPOINTMENT (OUTPATIENT)
Dept: CARDIOLOGY | Facility: CLINIC | Age: 53
End: 2023-10-16
Payer: COMMERCIAL

## 2023-10-16 VITALS
HEIGHT: 70 IN | SYSTOLIC BLOOD PRESSURE: 118 MMHG | BODY MASS INDEX: 35.79 KG/M2 | DIASTOLIC BLOOD PRESSURE: 78 MMHG | OXYGEN SATURATION: 95 % | HEART RATE: 77 BPM | TEMPERATURE: 98 F | WEIGHT: 250 LBS

## 2023-10-16 DIAGNOSIS — I34.0 NONRHEUMATIC MITRAL (VALVE) INSUFFICIENCY: ICD-10-CM

## 2023-10-16 DIAGNOSIS — Z09 ENCOUNTER FOR FOLLOW-UP EXAMINATION AFTER COMPLETED TREATMENT FOR CONDITIONS OTHER THAN MALIGNANT NEOPLASM: ICD-10-CM

## 2023-10-16 PROCEDURE — 93000 ELECTROCARDIOGRAM COMPLETE: CPT

## 2023-10-16 PROCEDURE — 99214 OFFICE O/P EST MOD 30 MIN: CPT | Mod: 25

## 2023-12-13 ENCOUNTER — OFFICE (OUTPATIENT)
Dept: URBAN - METROPOLITAN AREA CLINIC 115 | Facility: CLINIC | Age: 53
Setting detail: OPHTHALMOLOGY
End: 2023-12-13
Payer: COMMERCIAL

## 2023-12-13 DIAGNOSIS — H35.033: ICD-10-CM

## 2023-12-13 DIAGNOSIS — H25.13: ICD-10-CM

## 2023-12-13 DIAGNOSIS — H40.1131: ICD-10-CM

## 2023-12-13 DIAGNOSIS — H04.123: ICD-10-CM

## 2023-12-13 DIAGNOSIS — E11.9: ICD-10-CM

## 2023-12-13 PROCEDURE — 92014 COMPRE OPH EXAM EST PT 1/>: CPT | Performed by: OPHTHALMOLOGY

## 2023-12-13 PROCEDURE — 92250 FUNDUS PHOTOGRAPHY W/I&R: CPT | Performed by: OPHTHALMOLOGY

## 2023-12-13 ASSESSMENT — SUPERFICIAL PUNCTATE KERATITIS (SPK)
OD_SPK: 1+
OS_SPK: 1+

## 2023-12-13 ASSESSMENT — REFRACTION_CURRENTRX
OD_AXIS: 157
OS_AXIS: 090
OD_OVR_VA: 20/
OD_AXIS: 138
OD_VPRISM_DIRECTION: PROGS
OS_ADD: +2.00
OS_ADD: +1.50
OD_OVR_VA: 20/
OS_OVR_VA: 20/
OD_CYLINDER: -0.25
OD_ADD: +1.50
OS_VPRISM_DIRECTION: PROGS
OS_CYLINDER: -0.25
OS_SPHERE: +0.75
OS_AXIS: 169
OS_OVR_VA: 20/
OD_SPHERE: +0.50
OS_CYLINDER: -0.25
OS_SPHERE: +0.50
OD_CYLINDER: -0.50
OD_SPHERE: +0.75
OD_ADD: +1.25

## 2023-12-13 ASSESSMENT — REFRACTION_AUTOREFRACTION
OS_AXIS: 168
OD_SPHERE: +1.25
OS_CYLINDER: -0.25
OS_SPHERE: +1.25
OD_AXIS: 151
OD_CYLINDER: -0.25

## 2023-12-13 ASSESSMENT — SPHEQUIV_DERIVED
OS_SPHEQUIV: 1.125
OD_SPHEQUIV: 0.375
OD_SPHEQUIV: 1.125

## 2023-12-13 ASSESSMENT — REFRACTION_MANIFEST
OD_ADD: ++2.25
OS_VA1: 20/20
OD_VA1: 20/20
OD_SPHERE: +0.50
OS_ADD: ++2.25
OS_SPHERE: +0.75
OD_CYLINDER: -0.25
OD_AXIS: 160

## 2023-12-13 ASSESSMENT — CONFRONTATIONAL VISUAL FIELD TEST (CVF)
OS_FINDINGS: FULL
OD_FINDINGS: FULL

## 2023-12-14 ENCOUNTER — NON-APPOINTMENT (OUTPATIENT)
Age: 53
End: 2023-12-14

## 2023-12-26 ENCOUNTER — RX RENEWAL (OUTPATIENT)
Age: 53
End: 2023-12-26

## 2023-12-28 ENCOUNTER — APPOINTMENT (OUTPATIENT)
Dept: INTERNAL MEDICINE | Facility: CLINIC | Age: 53
End: 2023-12-28
Payer: COMMERCIAL

## 2023-12-28 VITALS
RESPIRATION RATE: 14 BRPM | OXYGEN SATURATION: 98 % | DIASTOLIC BLOOD PRESSURE: 82 MMHG | HEART RATE: 74 BPM | WEIGHT: 249 LBS | HEIGHT: 70 IN | SYSTOLIC BLOOD PRESSURE: 144 MMHG | BODY MASS INDEX: 35.65 KG/M2

## 2023-12-28 DIAGNOSIS — Z00.00 ENCOUNTER FOR GENERAL ADULT MEDICAL EXAMINATION W/OUT ABNORMAL FINDINGS: ICD-10-CM

## 2023-12-28 PROCEDURE — 36415 COLL VENOUS BLD VENIPUNCTURE: CPT

## 2023-12-28 PROCEDURE — 99396 PREV VISIT EST AGE 40-64: CPT | Mod: 25

## 2023-12-28 NOTE — HEALTH RISK ASSESSMENT
[Fair] : ~his/her~ current health as fair  [Very Good] : ~his/her~  mood as very good [No] : In the past 12 months have you used drugs other than those required for medical reasons? No [No falls in past year] : Patient reported no falls in the past year [0] : 2) Feeling down, depressed, or hopeless: Not at all (0) [PHQ-2 Negative - No further assessment needed] : PHQ-2 Negative - No further assessment needed [None] : None [With Significant Other] : lives with significant other [With Family] : lives with family [# of Members in Household ___] :  household currently consist of [unfilled] member(s) [Employed] : employed [College] : College [] :  [# Of Children ___] : has [unfilled] children [Feels Safe at Home] : Feels safe at home [Fully functional (bathing, dressing, toileting, transferring, walking, feeding)] : Fully functional (bathing, dressing, toileting, transferring, walking, feeding) [Fully functional (using the telephone, shopping, preparing meals, housekeeping, doing laundry, using] : Fully functional and needs no help or supervision to perform IADLs (using the telephone, shopping, preparing meals, housekeeping, doing laundry, using transportation, managing medications and managing finances) [Reports changes in vision] : Reports changes in vision [Smoke Detector] : smoke detector [Guns at Home] : guns at home [Carbon Monoxide Detector] : carbon monoxide detector [Seat Belt] :  uses seat belt [Reviewed no changes] : Reviewed, no changes [Never] : Never [Discussed at today's visit] : Advance Directives Discussed at today's visit [Designated Healthcare Proxy] : Designated healthcare proxy [Name: ___] : Health Care Proxy's Name: [unfilled]  [Audit-CScore] : 0 [de-identified] : no exercise [de-identified] : fair [[id number]] : 0 [Change in mental status noted] : No change in mental status noted [Reports changes in hearing] : Reports no changes in hearing [Reports changes in dental health] : Reports no changes in dental health [Sunscreen] : does not use sunscreen [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

## 2023-12-28 NOTE — COUNSELING
[Potential consequences of obesity discussed] : Potential consequences of obesity discussed [Benefits of weight loss discussed] : Benefits of weight loss discussed [Structured Weight Management Program suggested:] : Structured weight management program suggested [Encouraged to maintain food diary] : Encouraged to maintain food diary [Encouraged to increase physical activity] : Encouraged to increase physical activity [Target Wt Loss Goal ___] : Weight Loss Goals: Target weight loss goal [unfilled] lbs [Weight management counseling provided] : Weight management [Healthy eating counseling provided] : healthy eating [Activity counseling provided] : activity [Low Fat Diet] : Low fat diet [Decrease Portions] : Decrease food portions [None] : None [Needs reinforcement, provided] : Patient needs reinforcement on understanding lifestyle changes and  the steps needed to achieve self management goals and reinforcement was provided [FreeTextEntry1] : Weight Watchers

## 2023-12-28 NOTE — PHYSICAL EXAM
[General Appearance - Alert] : alert [Sclera] : the sclera and conjunctiva were normal [General Appearance - In No Acute Distress] : in no acute distress [PERRL With Normal Accommodation] : pupils were equal in size, round, and reactive to light [Extraocular Movements] : extraocular movements were intact [Outer Ear] : the ears and nose were normal in appearance [Oropharynx] : the oropharynx was normal [Neck Appearance] : the appearance of the neck was normal [Neck Cervical Mass (___cm)] : no neck mass was observed [Jugular Venous Distention Increased] : there was no jugular-venous distention [Thyroid Diffuse Enlargement] : the thyroid was not enlarged [Auscultation Breath Sounds / Voice Sounds] : lungs were clear to auscultation bilaterally [Heart Rate And Rhythm] : heart rate was normal and rhythm regular [Heart Sounds] : normal S1 and S2 [Heart Sounds Gallop] : no gallops [Murmurs] : no murmurs [Arterial Pulses Carotid] : carotid pulses were normal with no bruits [Heart Sounds Pericardial Friction Rub] : no pericardial rub [Abdominal Aorta] : the abdominal aorta was normal [Full Pulse] : the pedal pulses are present [Edema] : there was no peripheral edema [Bowel Sounds] : normal bowel sounds [Abdomen Soft] : soft [Abdomen Tenderness] : non-tender [Abdomen Mass (___ Cm)] : no abdominal mass palpated [Cervical Lymph Nodes Enlarged Posterior Bilaterally] : posterior cervical [Cervical Lymph Nodes Enlarged Anterior Bilaterally] : anterior cervical [Supraclavicular Lymph Nodes Enlarged Bilaterally] : supraclavicular [Axillary Lymph Nodes Enlarged Bilaterally] : axillary [Femoral Lymph Nodes Enlarged Bilaterally] : femoral [Inguinal Lymph Nodes Enlarged Bilaterally] : inguinal [No Spinal Tenderness] : no spinal tenderness [Abnormal Walk] : normal gait [Nail Clubbing] : no clubbing  or cyanosis of the fingernails [Musculoskeletal - Swelling] : no joint swelling seen [Motor Tone] : muscle strength and tone were normal [Skin Color & Pigmentation] : normal skin color and pigmentation [Skin Turgor] : normal skin turgor [Right Foot Was Examined] : right foot was examined [Left Foot Was Examined] : left foot was examined [Normal Appearance] : normal in appearance [Normal in Appearance] : normal in appearance [Normal] : normal [1+] : 1+ in the dorsalis pedis [No Focal Deficits] : no focal deficits [Cranial Nerves] : cranial nerves 2-12 were intact [Oriented To Time, Place, And Person] : oriented to person, place, and time [Impaired Insight] : insight and judgment were intact [Affect] : the affect was normal [] : the neck was supple [Thyroid Nodule] : there were no palpable thyroid nodules [#1 Diminished] : number 1 was normal [#2 Diminished] : number 2 was normal [#3 Diminished] : number 3 was normal [#4 Diminished] : number 4 was normal [#5 Diminished] : number 5 was normal [#6 Diminished] : number 6 was normal [#7 Diminished] : number 7 was normal [#8 Diminished] : number 8 was normal [#9 Diminished] : number 9 was normal [#10 Diminished] : number 10 was normal [FreeTextEntry1] : Umbilical keloids [Over the Past 2 Weeks, Have You Felt Down, Depressed, or Hopeless?] : 1.) Over the past 2 weeks, have you felt down, depressed, or hopeless? No [Over the Past 2 Weeks, Have You Felt Little Interest or Pleasure Doing Things?] : 2.) Over the past 2 weeks, have you felt little interest or pleasure doing things? No

## 2023-12-28 NOTE — HISTORY OF PRESENT ILLNESS
[FreeTextEntry1] : 53-year-old female in for her yearly physical.  Patient with known history of prediabetes with significant increased risk of diabetes secondary to family history and history of gestational diabetes.Unfortunately patient blood work 2019 showed her to be type II diabetic and has been on metformin since with Jardiance added afterward.  Hemoglobin A1c was at 10.2 and down to 6.6 with most recent at 7.2.. Still has not made any meaningful lifestyle changes without weight loss  she is generally feeling well without chest pain, palpitations, shortness of breath or edema   Patient has significant dysfunctional uterine bleeding and ultimately had a robotic assisted hysterectomy October 2015 with resolution of her anemia.  She has a left thyroid nodule for which she was following with endocrinologist Dr. Leahy.She had an FNA biopsy June of 2017= benign. Followup thyroid ultrasound July 2020 showed continued growth of dominant left nodule. She had an FNA biopsy with pathology = benign Follow-up ultrasound showed further increase size with some throat and breathing symptoms therefore patient pursued and had a left hemithyroidectomy February 2023 with success reporting pathology is benign.  Breathing is better.  The patient had was a partial torn meniscus of her left knee October 2017. For this she saw orthopedic Dr Starkey who referred her for physical therapy which she did not do and her knee has improved.  The patient does have a ventral hernia as well as umbilical hernia for which she has seen surgery who recommended surgery but not until patient loses weight.  Cardiology evaluation 2019 with a negative stress test and echocardiogram Showing mild MR/TR/MO. Coronary calcium CAT scan December 2021 score = 0. Preop cardiac evaluation January 2023 with echocardiogram stable.  she did struggle early 2021 with anxiety and depression mainly reaction to her father's death and the death of her Godmother who she was very close to. She is better. Her mother is alive and lives with the patient and things are going okay.  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

## 2023-12-28 NOTE — DATA REVIEWED
[FreeTextEntry1] : Echocardiogram January 2023 showing normal LVEF with mild concentric LVH with no valvular issues

## 2023-12-28 NOTE — ASSESSMENT
[FreeTextEntry1] : 53-year-old female with type 2 diabetes diagnosed May 2019 with improved control with patient currently on metformin and Jardiance.  She continues to need lifestyle changes with diet and exercise.  Patient blood pressure is good but discussed renal protection using an ACEI/ARB therefore is on ramipril 2.5 mg daily.   Patient again long discussion and encouraged a low carb diet with regular exercise and resultant weight loss. Goal is for a 10-15 pound weight loss in the next 3 months. Recommended restart Weight Watchers.  Coronary calcium CAT scan December 2021 score = 0  Large left thyroid nodule status post left hemithyroidectomy February 2023 with success.  Pathology benign.  Echocardiogram September 2019 Mammography May 2021 CT scan of the chest in June 2023 showing stable nodule GYN followup overdue therefore to schedule Colonoscopy October 2019 with followup in 10 years Ophthalmology UTD  Recieved the CIOVID vaccine influenza vaccine  continues to decline pneumococcal vaccines  Followup in 3 months  Venipuncture done today in the office.

## 2024-01-02 LAB
ALBUMIN SERPL ELPH-MCNC: 4.6 G/DL
ALP BLD-CCNC: 90 U/L
ALT SERPL-CCNC: 19 U/L
ANION GAP SERPL CALC-SCNC: 12 MMOL/L
APPEARANCE: CLEAR
AST SERPL-CCNC: 17 U/L
BACTERIA: ABNORMAL /HPF
BILIRUB SERPL-MCNC: 0.3 MG/DL
BILIRUBIN URINE: NEGATIVE
BLOOD URINE: NEGATIVE
BUN SERPL-MCNC: 12 MG/DL
CALCIUM SERPL-MCNC: 9.3 MG/DL
CAST: 0 /LPF
CHLORIDE SERPL-SCNC: 102 MMOL/L
CHOLEST SERPL-MCNC: 154 MG/DL
CO2 SERPL-SCNC: 27 MMOL/L
COLOR: YELLOW
CREAT SERPL-MCNC: 0.96 MG/DL
CREAT SPEC-SCNC: 101 MG/DL
EGFR: 71 ML/MIN/1.73M2
EPITHELIAL CELLS: 6 /HPF
ESTIMATED AVERAGE GLUCOSE: 171 MG/DL
GLUCOSE QUALITATIVE U: >=1000 MG/DL
GLUCOSE SERPL-MCNC: 157 MG/DL
HBA1C MFR BLD HPLC: 7.6 %
HDLC SERPL-MCNC: 45 MG/DL
KETONES URINE: NEGATIVE MG/DL
LDLC SERPL CALC-MCNC: 93 MG/DL
LEUKOCYTE ESTERASE URINE: NEGATIVE
MICROALBUMIN 24H UR DL<=1MG/L-MCNC: 3.4 MG/DL
MICROALBUMIN/CREAT 24H UR-RTO: 34 MG/G
MICROSCOPIC-UA: NORMAL
NITRITE URINE: NEGATIVE
NONHDLC SERPL-MCNC: 110 MG/DL
PH URINE: 5.5
POTASSIUM SERPL-SCNC: 4.5 MMOL/L
PROT SERPL-MCNC: 7 G/DL
PROTEIN URINE: NEGATIVE MG/DL
RED BLOOD CELLS URINE: 1 /HPF
SODIUM SERPL-SCNC: 141 MMOL/L
SPECIFIC GRAVITY URINE: >1.03
TRIGL SERPL-MCNC: 90 MG/DL
UROBILINOGEN URINE: 0.2 MG/DL
WHITE BLOOD CELLS URINE: 8 /HPF

## 2024-02-14 LAB
BASOPHILS # BLD AUTO: 0.02 K/UL
BASOPHILS NFR BLD AUTO: 0.3 %
EOSINOPHIL # BLD AUTO: 0.23 K/UL
EOSINOPHIL NFR BLD AUTO: 3.5 %
HCT VFR BLD CALC: 42.1 %
HGB BLD-MCNC: 13.4 G/DL
IMM GRANULOCYTES NFR BLD AUTO: 0.2 %
LYMPHOCYTES # BLD AUTO: 3.35 K/UL
LYMPHOCYTES NFR BLD AUTO: 51.2 %
MAN DIFF?: NORMAL
MCHC RBC-ENTMCNC: 28.3 PG
MCHC RBC-ENTMCNC: 31.8 GM/DL
MCV RBC AUTO: 89 FL
MONOCYTES # BLD AUTO: 0.43 K/UL
MONOCYTES NFR BLD AUTO: 6.6 %
NEUTROPHILS # BLD AUTO: 2.5 K/UL
NEUTROPHILS NFR BLD AUTO: 38.2 %
PLATELET # BLD AUTO: 318 K/UL
RBC # BLD: 4.73 M/UL
RBC # FLD: 13 %
WBC # FLD AUTO: 6.54 K/UL

## 2024-02-16 LAB
CREAT 24H UR-MCNC: 2.1 G/24 H
CREAT ?TM UR-MCNC: 81 MG/DL
PROT 24H UR-MRATE: 7 MG/DL
PROT ?TM UR-MCNC: 24 HR
PROT UR-MCNC: 182 MG/24 H
SPECIMEN VOL 24H UR: 2600 ML

## 2024-03-26 ENCOUNTER — RX RENEWAL (OUTPATIENT)
Age: 54
End: 2024-03-26

## 2024-03-26 RX ORDER — METFORMIN ER 500 MG 500 MG/1
500 TABLET ORAL TWICE DAILY
Qty: 360 | Refills: 1 | Status: ACTIVE | COMMUNITY
Start: 2019-05-13 | End: 1900-01-01

## 2024-04-03 ENCOUNTER — APPOINTMENT (OUTPATIENT)
Dept: INTERNAL MEDICINE | Facility: CLINIC | Age: 54
End: 2024-04-03
Payer: COMMERCIAL

## 2024-04-03 VITALS
HEART RATE: 84 BPM | SYSTOLIC BLOOD PRESSURE: 124 MMHG | HEIGHT: 70 IN | DIASTOLIC BLOOD PRESSURE: 80 MMHG | WEIGHT: 246 LBS | OXYGEN SATURATION: 96 % | BODY MASS INDEX: 35.22 KG/M2 | RESPIRATION RATE: 13 BRPM

## 2024-04-03 DIAGNOSIS — E11.9 TYPE 2 DIABETES MELLITUS W/OUT COMPLICATIONS: ICD-10-CM

## 2024-04-03 DIAGNOSIS — E78.5 HYPERLIPIDEMIA, UNSPECIFIED: ICD-10-CM

## 2024-04-03 DIAGNOSIS — D72.820 LYMPHOCYTOSIS (SYMPTOMATIC): ICD-10-CM

## 2024-04-03 DIAGNOSIS — E04.1 NONTOXIC SINGLE THYROID NODULE: ICD-10-CM

## 2024-04-03 DIAGNOSIS — Z79.899 OTHER LONG TERM (CURRENT) DRUG THERAPY: ICD-10-CM

## 2024-04-03 DIAGNOSIS — E66.9 OBESITY, UNSPECIFIED: ICD-10-CM

## 2024-04-03 DIAGNOSIS — K76.0 FATTY (CHANGE OF) LIVER, NOT ELSEWHERE CLASSIFIED: ICD-10-CM

## 2024-04-03 PROCEDURE — G2211 COMPLEX E/M VISIT ADD ON: CPT

## 2024-04-03 PROCEDURE — 99214 OFFICE O/P EST MOD 30 MIN: CPT

## 2024-04-03 PROCEDURE — 36415 COLL VENOUS BLD VENIPUNCTURE: CPT

## 2024-04-03 NOTE — HISTORY OF PRESENT ILLNESS
[FreeTextEntry1] : Patient presents for followup on fatty liver/T2DM/obesity. Patient is trying to improve her fatty liver/T2DM with lifestyle changes as well as her obesity, is on metformin/jardiance for type 2 diabetes and Crestor for hyperlipidemia. -got covid vaccines  -Status post left hemithyroidectomy 2/2023 -Patient has had persistent lymphocytosis, flow cytometry to be checked

## 2024-04-03 NOTE — ASSESSMENT
[FreeTextEntry1] : Venipuncture done in our office, Labs sent out and further recommendations will be made based on lab results.Patient continues to need lifestyle changes with weight loss. Patient will return to office in 3-4months   echo via cardio Thyroid sonogram= via specialist Mammogram was 6/2023 specialists include 1. GYN=Dr. Storey 2. Endocrinology-Dr. Leahy-prn 3.hematology-Dr. Junior-declines follow up 4. Orthopedic-Dr. Starkey-no need for follow up"knee is fine now" 5.ophthalmology-Dr. Reyes-12/2023 6.Surgeon-Dr. Esquivel 7.Cardio-Dr. Mancia 8.Head and neck MD-Dr. Ortega @  declines pneumococcal vaccine/shingrix vaccines but +got covid vaccines  colonoscopy-10/2019-next in 10years stress test 12/2019 Microalbumin 12/2023 24 hour urine 2/2024 CT calcium score = 0=12/2021= incidental pulmonary nodule seen= CT chest=6/2023. Tylenol/Prescriptions electronically submitted to pharmacy from [facility]

## 2024-04-05 LAB
ALBUMIN SERPL ELPH-MCNC: 4.6 G/DL
ALP BLD-CCNC: 92 U/L
ALT SERPL-CCNC: 20 U/L
ANION GAP SERPL CALC-SCNC: 15 MMOL/L
AST SERPL-CCNC: 17 U/L
BASOPHILS # BLD AUTO: 0.01 K/UL
BASOPHILS NFR BLD AUTO: 0.2 %
BILIRUB SERPL-MCNC: 0.4 MG/DL
BUN SERPL-MCNC: 17 MG/DL
CALCIUM SERPL-MCNC: 9.7 MG/DL
CHLORIDE SERPL-SCNC: 101 MMOL/L
CHOLEST SERPL-MCNC: 156 MG/DL
CO2 SERPL-SCNC: 23 MMOL/L
CREAT SERPL-MCNC: 0.87 MG/DL
EGFR: 80 ML/MIN/1.73M2
EOSINOPHIL # BLD AUTO: 0.17 K/UL
EOSINOPHIL NFR BLD AUTO: 2.9 %
ESTIMATED AVERAGE GLUCOSE: 166 MG/DL
GLUCOSE SERPL-MCNC: 127 MG/DL
HBA1C MFR BLD HPLC: 7.4 %
HCT VFR BLD CALC: 42.5 %
HDLC SERPL-MCNC: 44 MG/DL
HGB BLD-MCNC: 13.8 G/DL
IMM GRANULOCYTES NFR BLD AUTO: 0.2 %
LDLC SERPL CALC-MCNC: 93 MG/DL
LYMPHOCYTES # BLD AUTO: 2.79 K/UL
LYMPHOCYTES NFR BLD AUTO: 47.2 %
MAN DIFF?: NORMAL
MCHC RBC-ENTMCNC: 29.2 PG
MCHC RBC-ENTMCNC: 32.5 GM/DL
MCV RBC AUTO: 90 FL
MONOCYTES # BLD AUTO: 0.32 K/UL
MONOCYTES NFR BLD AUTO: 5.4 %
NEUTROPHILS # BLD AUTO: 2.61 K/UL
NEUTROPHILS NFR BLD AUTO: 44.1 %
NONHDLC SERPL-MCNC: 112 MG/DL
PLATELET # BLD AUTO: 308 K/UL
POTASSIUM SERPL-SCNC: 4.5 MMOL/L
PROT SERPL-MCNC: 7.2 G/DL
RBC # BLD: 4.72 M/UL
RBC # FLD: 13 %
SODIUM SERPL-SCNC: 140 MMOL/L
T4 FREE SERPL-MCNC: 1.2 NG/DL
TRIGL SERPL-MCNC: 105 MG/DL
TSH SERPL-ACNC: 2.22 UIU/ML
WBC # FLD AUTO: 5.91 K/UL

## 2024-05-01 ENCOUNTER — RX RENEWAL (OUTPATIENT)
Age: 54
End: 2024-05-01

## 2024-05-01 RX ORDER — RAMIPRIL 2.5 MG/1
2.5 CAPSULE ORAL
Qty: 90 | Refills: 1 | Status: ACTIVE | COMMUNITY
Start: 2020-07-08 | End: 1900-01-01

## 2024-06-03 ENCOUNTER — APPOINTMENT (OUTPATIENT)
Dept: INTERNAL MEDICINE | Facility: CLINIC | Age: 54
End: 2024-06-03
Payer: COMMERCIAL

## 2024-06-03 VITALS
OXYGEN SATURATION: 95 % | RESPIRATION RATE: 14 BRPM | BODY MASS INDEX: 35.79 KG/M2 | SYSTOLIC BLOOD PRESSURE: 134 MMHG | WEIGHT: 250 LBS | DIASTOLIC BLOOD PRESSURE: 80 MMHG | HEIGHT: 70 IN | HEART RATE: 79 BPM

## 2024-06-03 DIAGNOSIS — H00.014 HORDEOLUM EXTERNUM LEFT UPPER EYELID: ICD-10-CM

## 2024-06-03 PROCEDURE — G2211 COMPLEX E/M VISIT ADD ON: CPT

## 2024-06-03 PROCEDURE — 99213 OFFICE O/P EST LOW 20 MIN: CPT

## 2024-06-03 NOTE — HISTORY OF PRESENT ILLNESS
Massage Therapy Progress Note      Length of treatment: 60-minute    Authorization: Patient request    Reviewed contraindications/current health status with Patient    No Contraindications to massage therapy exist    Subjective:    Pt presents today in good health with no significant health history. 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 She stays fit with Yoga and various work out classes at her gym. Recently she's been feeling (R) interscapular tension and minimal pain pattern which started last week.     Patient complains of:  Stiffness/tension in (R) interscapulars    Pain report prior to treatment:  Pain relief not a goal of treatment    Type of treatment requested: Wellness massage and Therapeutic massage    Specific requests:  Neck/shoulder/upper back     Objective Observations:  Hypertonicity noted in:  (L) middle and posterior scalene. (L) upper trap; (R) anterior thigh    Hypotonicity/Ischemia noted in: None noted    Decreased ROM noted in No areas    Additional observations:  Very healthy tissues throughout, hydrated and with great elastic recoil.     Assessment:  Patient received Neuromuscular therapy, Swedish techniques and Gentle comfort massage techniques to affected tissues. Began prone; concluded in supine.     Additional treatment provided: None at this time    Response to treatment: Hypertonicity in (L) upper trap tissues decreased    Pain report after treatment:Pain relief not a goal of treatment    Education/Resources: None provided this visit    Plan/Recommendations:  Massage therapy as desired  Session concluded   [FreeTextEntry8] : Patient presents for an acute visit secondary to pain and swelling of her left upper eyelid worsening over 1 week.  She has been using hot compresses with minimal benefit.  No drainage from her eye or redness.  Right eye no issue. Some head congestion and slight sore throat.  No fever or chills.

## 2024-06-03 NOTE — ASSESSMENT
[FreeTextEntry1] : Signs and symptoms most compatible with hordeolum of the left upper eyelid treated with Cortisporin ophthalmic solution and recommended continued warm compresses.

## 2024-06-03 NOTE — PHYSICAL EXAM
[No Acute Distress] : no acute distress [Well-Appearing] : well-appearing [Normal Outer Ear/Nose] : the outer ears and nose were normal in appearance [Normal Oropharynx] : the oropharynx was normal [Normal TMs] : both tympanic membranes were normal [No Lymphadenopathy] : no lymphadenopathy [No Respiratory Distress] : no respiratory distress  [No Accessory Muscle Use] : no accessory muscle use [Clear to Auscultation] : lungs were clear to auscultation bilaterally [Normal Rate] : normal rate  [Regular Rhythm] : with a regular rhythm [de-identified] : Tiny nodular swelling left upper eyelid which is tender reproducing patient's symptoms.  Some injection of inner eyelid.

## 2024-06-04 RX ORDER — NEOMYCIN SULFATE, POLYMYXIN B SULFATE AND HYDROCORTISONE 3.5; 10000; 1 MG/ML; [USP'U]/ML; MG/ML
3.5-10000-1 SUSPENSION OPHTHALMIC 4 TIMES DAILY
Qty: 1 | Refills: 0 | Status: DISCONTINUED | COMMUNITY
Start: 2024-06-03 | End: 2024-06-04

## 2024-06-04 RX ORDER — NEOMYCIN SULFATE, POLYMYXIN B SULFATE AND DEXAMETHASONE 3.5; 10000; 1 MG/ML; [USP'U]/ML; MG/ML
3.5-10000-0.1 SUSPENSION OPHTHALMIC EVERY 6 HOURS
Qty: 1 | Refills: 0 | Status: ACTIVE | COMMUNITY
Start: 2024-06-04 | End: 1900-01-01

## 2024-06-11 ENCOUNTER — RX RENEWAL (OUTPATIENT)
Age: 54
End: 2024-06-11

## 2024-06-11 RX ORDER — ROSUVASTATIN CALCIUM 5 MG/1
5 TABLET, FILM COATED ORAL DAILY
Qty: 90 | Refills: 1 | Status: ACTIVE | COMMUNITY
Start: 2019-05-13 | End: 1900-01-01

## 2024-06-12 ENCOUNTER — OFFICE (OUTPATIENT)
Dept: URBAN - METROPOLITAN AREA CLINIC 115 | Facility: CLINIC | Age: 54
Setting detail: OPHTHALMOLOGY
End: 2024-06-12
Payer: COMMERCIAL

## 2024-06-12 DIAGNOSIS — E11.9: ICD-10-CM

## 2024-06-12 DIAGNOSIS — H04.123: ICD-10-CM

## 2024-06-12 DIAGNOSIS — H25.13: ICD-10-CM

## 2024-06-12 DIAGNOSIS — H40.1131: ICD-10-CM

## 2024-06-12 DIAGNOSIS — H35.033: ICD-10-CM

## 2024-06-12 PROCEDURE — 92133 CPTRZD OPH DX IMG PST SGM ON: CPT | Performed by: OPHTHALMOLOGY

## 2024-06-12 PROCEDURE — 92014 COMPRE OPH EXAM EST PT 1/>: CPT | Performed by: OPHTHALMOLOGY

## 2024-06-12 PROCEDURE — 92083 EXTENDED VISUAL FIELD XM: CPT | Performed by: OPHTHALMOLOGY

## 2024-06-12 ASSESSMENT — CONFRONTATIONAL VISUAL FIELD TEST (CVF)
OD_FINDINGS: FULL
OS_FINDINGS: FULL

## 2024-06-13 ENCOUNTER — OFFICE (OUTPATIENT)
Dept: URBAN - METROPOLITAN AREA CLINIC 63 | Facility: CLINIC | Age: 54
Setting detail: OPHTHALMOLOGY
End: 2024-06-13
Payer: COMMERCIAL

## 2024-06-13 DIAGNOSIS — H01.00B: ICD-10-CM

## 2024-06-13 DIAGNOSIS — H01.00A: ICD-10-CM

## 2024-06-13 DIAGNOSIS — H00.034: ICD-10-CM

## 2024-06-13 DIAGNOSIS — H00.15: ICD-10-CM

## 2024-06-13 DIAGNOSIS — H00.14: ICD-10-CM

## 2024-06-13 PROCEDURE — 92285 EXTERNAL OCULAR PHOTOGRAPHY: CPT | Performed by: OPHTHALMOLOGY

## 2024-06-13 PROCEDURE — 99213 OFFICE O/P EST LOW 20 MIN: CPT | Mod: 25 | Performed by: OPHTHALMOLOGY

## 2024-06-13 PROCEDURE — 67700 BLEPHAROTOMY DRG ABSC EYELID: CPT | Mod: E1 | Performed by: OPHTHALMOLOGY

## 2024-06-13 ASSESSMENT — CONFRONTATIONAL VISUAL FIELD TEST (CVF)
OS_FINDINGS: FULL
OD_FINDINGS: FULL

## 2024-06-13 ASSESSMENT — LID EXAM ASSESSMENTS
OD_BLEPHARITIS: RLL RUL 1+
OS_BLEPHARITIS: LLL LUL 1+

## 2024-06-14 ENCOUNTER — RX ONLY (RX ONLY)
Age: 54
End: 2024-06-14

## 2024-06-22 ENCOUNTER — OFFICE (OUTPATIENT)
Dept: URBAN - METROPOLITAN AREA CLINIC 112 | Facility: CLINIC | Age: 54
Setting detail: OPHTHALMOLOGY
End: 2024-06-22
Payer: COMMERCIAL

## 2024-06-22 DIAGNOSIS — H01.005: ICD-10-CM

## 2024-06-22 DIAGNOSIS — H01.002: ICD-10-CM

## 2024-06-22 DIAGNOSIS — H00.034: ICD-10-CM

## 2024-06-22 DIAGNOSIS — H01.004: ICD-10-CM

## 2024-06-22 DIAGNOSIS — H01.001: ICD-10-CM

## 2024-06-22 DIAGNOSIS — H00.14: ICD-10-CM

## 2024-06-22 PROCEDURE — 99024 POSTOP FOLLOW-UP VISIT: CPT | Performed by: OPHTHALMOLOGY

## 2024-06-22 ASSESSMENT — LID EXAM ASSESSMENTS
OD_BLEPHARITIS: RLL RUL 1+
OS_BLEPHARITIS: LLL LUL 1+

## 2024-06-22 ASSESSMENT — CONFRONTATIONAL VISUAL FIELD TEST (CVF)
OD_FINDINGS: FULL
OS_FINDINGS: FULL

## 2024-06-24 ENCOUNTER — RX RENEWAL (OUTPATIENT)
Age: 54
End: 2024-06-24

## 2024-06-24 RX ORDER — EMPAGLIFLOZIN 25 MG/1
25 TABLET, FILM COATED ORAL
Qty: 90 | Refills: 1 | Status: ACTIVE | COMMUNITY
Start: 2022-09-09 | End: 1900-01-01

## 2024-07-05 ENCOUNTER — APPOINTMENT (OUTPATIENT)
Dept: INTERNAL MEDICINE | Facility: CLINIC | Age: 54
End: 2024-07-05
Payer: COMMERCIAL

## 2024-07-05 VITALS
HEIGHT: 70 IN | OXYGEN SATURATION: 94 % | BODY MASS INDEX: 34.93 KG/M2 | WEIGHT: 244 LBS | DIASTOLIC BLOOD PRESSURE: 80 MMHG | HEART RATE: 71 BPM | SYSTOLIC BLOOD PRESSURE: 125 MMHG | RESPIRATION RATE: 13 BRPM

## 2024-07-05 DIAGNOSIS — K76.0 FATTY (CHANGE OF) LIVER, NOT ELSEWHERE CLASSIFIED: ICD-10-CM

## 2024-07-05 DIAGNOSIS — E11.9 TYPE 2 DIABETES MELLITUS W/OUT COMPLICATIONS: ICD-10-CM

## 2024-07-05 DIAGNOSIS — E78.5 HYPERLIPIDEMIA, UNSPECIFIED: ICD-10-CM

## 2024-07-05 DIAGNOSIS — E66.9 OBESITY, UNSPECIFIED: ICD-10-CM

## 2024-07-05 DIAGNOSIS — Z79.899 OTHER LONG TERM (CURRENT) DRUG THERAPY: ICD-10-CM

## 2024-07-05 PROCEDURE — 36415 COLL VENOUS BLD VENIPUNCTURE: CPT

## 2024-07-05 PROCEDURE — 99214 OFFICE O/P EST MOD 30 MIN: CPT

## 2024-07-05 PROCEDURE — G2211 COMPLEX E/M VISIT ADD ON: CPT

## 2024-07-06 LAB
ALBUMIN SERPL ELPH-MCNC: 4.3 G/DL
ALP BLD-CCNC: 95 U/L
ALT SERPL-CCNC: 24 U/L
ANION GAP SERPL CALC-SCNC: 15 MMOL/L
AST SERPL-CCNC: 24 U/L
BASOPHILS # BLD AUTO: 0.03 K/UL
BASOPHILS NFR BLD AUTO: 0.5 %
BILIRUB SERPL-MCNC: 0.3 MG/DL
BUN SERPL-MCNC: 18 MG/DL
CALCIUM SERPL-MCNC: 9.2 MG/DL
CHLORIDE SERPL-SCNC: 101 MMOL/L
CHOLEST SERPL-MCNC: 142 MG/DL
CO2 SERPL-SCNC: 23 MMOL/L
CREAT SERPL-MCNC: 0.85 MG/DL
EGFR: 81 ML/MIN/1.73M2
EOSINOPHIL # BLD AUTO: 0.19 K/UL
EOSINOPHIL NFR BLD AUTO: 3.2 %
ESTIMATED AVERAGE GLUCOSE: 166 MG/DL
GLUCOSE SERPL-MCNC: 149 MG/DL
HBA1C MFR BLD HPLC: 7.4 %
HCT VFR BLD CALC: 40.5 %
HDLC SERPL-MCNC: 40 MG/DL
HGB BLD-MCNC: 13.4 G/DL
IMM GRANULOCYTES NFR BLD AUTO: 0.2 %
LDLC SERPL CALC-MCNC: 85 MG/DL
LYMPHOCYTES # BLD AUTO: 2.95 K/UL
LYMPHOCYTES NFR BLD AUTO: 49.7 %
MAN DIFF?: NORMAL
MCHC RBC-ENTMCNC: 29.4 PG
MCHC RBC-ENTMCNC: 33.1 GM/DL
MCV RBC AUTO: 88.8 FL
MONOCYTES # BLD AUTO: 0.4 K/UL
MONOCYTES NFR BLD AUTO: 6.7 %
NEUTROPHILS # BLD AUTO: 2.36 K/UL
NEUTROPHILS NFR BLD AUTO: 39.7 %
NONHDLC SERPL-MCNC: 102 MG/DL
PLATELET # BLD AUTO: 279 K/UL
POTASSIUM SERPL-SCNC: 4.1 MMOL/L
PROT SERPL-MCNC: 7 G/DL
RBC # BLD: 4.56 M/UL
RBC # FLD: 13.1 %
SODIUM SERPL-SCNC: 139 MMOL/L
T4 FREE SERPL-MCNC: 1.2 NG/DL
TRIGL SERPL-MCNC: 91 MG/DL
TSH SERPL-ACNC: 2.31 UIU/ML
WBC # FLD AUTO: 5.94 K/UL

## 2024-07-23 ENCOUNTER — OFFICE (OUTPATIENT)
Dept: URBAN - METROPOLITAN AREA CLINIC 112 | Facility: CLINIC | Age: 54
Setting detail: OPHTHALMOLOGY
End: 2024-07-23
Payer: COMMERCIAL

## 2024-07-23 ENCOUNTER — RX ONLY (RX ONLY)
Age: 54
End: 2024-07-23

## 2024-07-23 DIAGNOSIS — H00.14: ICD-10-CM

## 2024-07-23 DIAGNOSIS — H01.001: ICD-10-CM

## 2024-07-23 DIAGNOSIS — H00.034: ICD-10-CM

## 2024-07-23 DIAGNOSIS — H01.002: ICD-10-CM

## 2024-07-23 DIAGNOSIS — H01.005: ICD-10-CM

## 2024-07-23 DIAGNOSIS — H01.004: ICD-10-CM

## 2024-07-23 PROCEDURE — 92012 INTRM OPH EXAM EST PATIENT: CPT | Performed by: OPHTHALMOLOGY

## 2024-07-23 ASSESSMENT — LID EXAM ASSESSMENTS
OS_BLEPHARITIS: LLL LUL 1+
OD_BLEPHARITIS: RLL RUL 1+

## 2024-07-23 ASSESSMENT — CONFRONTATIONAL VISUAL FIELD TEST (CVF)
OD_FINDINGS: FULL
OS_FINDINGS: FULL

## 2024-07-24 ENCOUNTER — NON-APPOINTMENT (OUTPATIENT)
Age: 54
End: 2024-07-24

## 2024-09-14 ENCOUNTER — RX RENEWAL (OUTPATIENT)
Age: 54
End: 2024-09-14

## 2024-09-27 ENCOUNTER — RX RENEWAL (OUTPATIENT)
Age: 54
End: 2024-09-27

## 2024-10-15 ENCOUNTER — APPOINTMENT (OUTPATIENT)
Dept: INTERNAL MEDICINE | Facility: CLINIC | Age: 54
End: 2024-10-15
Payer: COMMERCIAL

## 2024-10-15 VITALS
RESPIRATION RATE: 14 BRPM | BODY MASS INDEX: 36.51 KG/M2 | HEIGHT: 70 IN | WEIGHT: 255 LBS | HEART RATE: 84 BPM | DIASTOLIC BLOOD PRESSURE: 80 MMHG | SYSTOLIC BLOOD PRESSURE: 125 MMHG | OXYGEN SATURATION: 96 %

## 2024-10-15 DIAGNOSIS — K76.0 FATTY (CHANGE OF) LIVER, NOT ELSEWHERE CLASSIFIED: ICD-10-CM

## 2024-10-15 DIAGNOSIS — Z79.899 OTHER LONG TERM (CURRENT) DRUG THERAPY: ICD-10-CM

## 2024-10-15 DIAGNOSIS — E11.9 TYPE 2 DIABETES MELLITUS W/OUT COMPLICATIONS: ICD-10-CM

## 2024-10-15 DIAGNOSIS — E66.9 OBESITY, UNSPECIFIED: ICD-10-CM

## 2024-10-15 DIAGNOSIS — E78.5 HYPERLIPIDEMIA, UNSPECIFIED: ICD-10-CM

## 2024-10-15 PROCEDURE — G2211 COMPLEX E/M VISIT ADD ON: CPT | Mod: NC

## 2024-10-15 PROCEDURE — 36415 COLL VENOUS BLD VENIPUNCTURE: CPT

## 2024-10-15 PROCEDURE — 99214 OFFICE O/P EST MOD 30 MIN: CPT

## 2024-10-16 LAB
ALBUMIN SERPL ELPH-MCNC: 4.3 G/DL
ALP BLD-CCNC: 84 U/L
ALT SERPL-CCNC: 23 U/L
ANION GAP SERPL CALC-SCNC: 16 MMOL/L
AST SERPL-CCNC: 21 U/L
BASOPHILS # BLD AUTO: 0.01 K/UL
BASOPHILS NFR BLD AUTO: 0.2 %
BILIRUB SERPL-MCNC: 0.4 MG/DL
BUN SERPL-MCNC: 16 MG/DL
CALCIUM SERPL-MCNC: 9.5 MG/DL
CHLORIDE SERPL-SCNC: 100 MMOL/L
CHOLEST SERPL-MCNC: 145 MG/DL
CO2 SERPL-SCNC: 23 MMOL/L
CREAT SERPL-MCNC: 0.94 MG/DL
EGFR: 72 ML/MIN/1.73M2
EOSINOPHIL # BLD AUTO: 0.19 K/UL
EOSINOPHIL NFR BLD AUTO: 3.1 %
ESTIMATED AVERAGE GLUCOSE: 177 MG/DL
GLUCOSE SERPL-MCNC: 150 MG/DL
HBA1C MFR BLD HPLC: 7.8 %
HCT VFR BLD CALC: 41.5 %
HDLC SERPL-MCNC: 43 MG/DL
HGB BLD-MCNC: 13.4 G/DL
IMM GRANULOCYTES NFR BLD AUTO: 0.2 %
LDLC SERPL CALC-MCNC: 81 MG/DL
LYMPHOCYTES # BLD AUTO: 3.28 K/UL
LYMPHOCYTES NFR BLD AUTO: 53.3 %
MAN DIFF?: NORMAL
MCHC RBC-ENTMCNC: 28.9 PG
MCHC RBC-ENTMCNC: 32.3 GM/DL
MCV RBC AUTO: 89.6 FL
MONOCYTES # BLD AUTO: 0.34 K/UL
MONOCYTES NFR BLD AUTO: 5.5 %
NEUTROPHILS # BLD AUTO: 2.32 K/UL
NEUTROPHILS NFR BLD AUTO: 37.7 %
NONHDLC SERPL-MCNC: 102 MG/DL
PLATELET # BLD AUTO: 296 K/UL
POTASSIUM SERPL-SCNC: 4.5 MMOL/L
PROT SERPL-MCNC: 7.1 G/DL
RBC # BLD: 4.63 M/UL
RBC # FLD: 13.2 %
SODIUM SERPL-SCNC: 139 MMOL/L
T4 FREE SERPL-MCNC: 1.3 NG/DL
TRIGL SERPL-MCNC: 113 MG/DL
TSH SERPL-ACNC: 4.06 UIU/ML
WBC # FLD AUTO: 6.15 K/UL

## 2024-11-07 ENCOUNTER — RX RENEWAL (OUTPATIENT)
Age: 54
End: 2024-11-07

## 2024-11-15 ENCOUNTER — APPOINTMENT (OUTPATIENT)
Dept: CARDIOLOGY | Facility: CLINIC | Age: 54
End: 2024-11-15
Payer: COMMERCIAL

## 2024-11-15 ENCOUNTER — NON-APPOINTMENT (OUTPATIENT)
Age: 54
End: 2024-11-15

## 2024-11-15 VITALS
HEIGHT: 70 IN | OXYGEN SATURATION: 95 % | DIASTOLIC BLOOD PRESSURE: 83 MMHG | SYSTOLIC BLOOD PRESSURE: 143 MMHG | BODY MASS INDEX: 32.21 KG/M2 | HEART RATE: 88 BPM | WEIGHT: 225 LBS

## 2024-11-15 DIAGNOSIS — E78.5 HYPERLIPIDEMIA, UNSPECIFIED: ICD-10-CM

## 2024-11-15 PROCEDURE — 99213 OFFICE O/P EST LOW 20 MIN: CPT | Mod: 25

## 2024-11-15 PROCEDURE — 93000 ELECTROCARDIOGRAM COMPLETE: CPT

## 2024-11-26 ENCOUNTER — NON-APPOINTMENT (OUTPATIENT)
Age: 54
End: 2024-11-26

## 2024-12-05 ENCOUNTER — APPOINTMENT (OUTPATIENT)
Dept: INTERNAL MEDICINE | Facility: CLINIC | Age: 54
End: 2024-12-05
Payer: COMMERCIAL

## 2024-12-05 VITALS
BODY MASS INDEX: 35.79 KG/M2 | HEART RATE: 89 BPM | WEIGHT: 250 LBS | RESPIRATION RATE: 15 BRPM | DIASTOLIC BLOOD PRESSURE: 82 MMHG | SYSTOLIC BLOOD PRESSURE: 120 MMHG | HEIGHT: 70 IN | OXYGEN SATURATION: 98 %

## 2024-12-05 DIAGNOSIS — Z01.818 ENCOUNTER FOR OTHER PREPROCEDURAL EXAMINATION: ICD-10-CM

## 2024-12-05 DIAGNOSIS — K76.0 FATTY (CHANGE OF) LIVER, NOT ELSEWHERE CLASSIFIED: ICD-10-CM

## 2024-12-05 DIAGNOSIS — E11.9 TYPE 2 DIABETES MELLITUS W/OUT COMPLICATIONS: ICD-10-CM

## 2024-12-05 DIAGNOSIS — E66.9 OBESITY, UNSPECIFIED: ICD-10-CM

## 2024-12-05 DIAGNOSIS — E78.5 HYPERLIPIDEMIA, UNSPECIFIED: ICD-10-CM

## 2024-12-05 PROCEDURE — G2211 COMPLEX E/M VISIT ADD ON: CPT | Mod: NC

## 2024-12-05 PROCEDURE — 99214 OFFICE O/P EST MOD 30 MIN: CPT

## 2024-12-10 NOTE — REVIEW OF SYSTEMS
Karyna Ledesma is calling to request a refill on the following medication(s):    Medication Request:  Requested Prescriptions     Pending Prescriptions Disp Refills    buPROPion (WELLBUTRIN XL) 300 MG extended release tablet 30 tablet 2     Sig: Take 1 tablet by mouth every morning       Last Visit Date (If Applicable):  11/15/2024    Next Visit Date:    Visit date not found               [Negative] : Gastrointestinal

## 2024-12-17 ENCOUNTER — NON-APPOINTMENT (OUTPATIENT)
Age: 54
End: 2024-12-17

## 2024-12-21 ENCOUNTER — RX RENEWAL (OUTPATIENT)
Age: 54
End: 2024-12-21

## 2025-01-22 ENCOUNTER — APPOINTMENT (OUTPATIENT)
Dept: INTERNAL MEDICINE | Facility: CLINIC | Age: 55
End: 2025-01-22

## 2025-01-22 ENCOUNTER — NON-APPOINTMENT (OUTPATIENT)
Age: 55
End: 2025-01-22

## 2025-01-22 VITALS
BODY MASS INDEX: 34.93 KG/M2 | RESPIRATION RATE: 15 BRPM | WEIGHT: 244 LBS | OXYGEN SATURATION: 100 % | HEART RATE: 76 BPM | SYSTOLIC BLOOD PRESSURE: 130 MMHG | HEIGHT: 70 IN | DIASTOLIC BLOOD PRESSURE: 80 MMHG

## 2025-01-22 VITALS
OXYGEN SATURATION: 100 % | WEIGHT: 244 LBS | RESPIRATION RATE: 15 BRPM | BODY MASS INDEX: 34.93 KG/M2 | HEART RATE: 83 BPM | DIASTOLIC BLOOD PRESSURE: 88 MMHG | SYSTOLIC BLOOD PRESSURE: 130 MMHG | HEIGHT: 70 IN

## 2025-01-22 DIAGNOSIS — Z79.899 OTHER LONG TERM (CURRENT) DRUG THERAPY: ICD-10-CM

## 2025-01-22 DIAGNOSIS — E78.5 HYPERLIPIDEMIA, UNSPECIFIED: ICD-10-CM

## 2025-01-22 DIAGNOSIS — K76.0 FATTY (CHANGE OF) LIVER, NOT ELSEWHERE CLASSIFIED: ICD-10-CM

## 2025-01-22 DIAGNOSIS — E66.9 OBESITY, UNSPECIFIED: ICD-10-CM

## 2025-01-22 DIAGNOSIS — E11.65 TYPE 2 DIABETES MELLITUS WITH HYPERGLYCEMIA: ICD-10-CM

## 2025-01-22 PROCEDURE — G2211 COMPLEX E/M VISIT ADD ON: CPT | Mod: NC

## 2025-01-22 PROCEDURE — 36415 COLL VENOUS BLD VENIPUNCTURE: CPT

## 2025-01-22 PROCEDURE — 99214 OFFICE O/P EST MOD 30 MIN: CPT | Mod: 25

## 2025-01-22 PROCEDURE — G0008: CPT

## 2025-01-22 PROCEDURE — 90656 IIV3 VACC NO PRSV 0.5 ML IM: CPT

## 2025-01-23 LAB
ALBUMIN SERPL ELPH-MCNC: 4.4 G/DL
ALP BLD-CCNC: 88 U/L
ALT SERPL-CCNC: 24 U/L
ANION GAP SERPL CALC-SCNC: 14 MMOL/L
APPEARANCE: CLEAR
AST SERPL-CCNC: 19 U/L
BACTERIA: ABNORMAL /HPF
BASOPHILS # BLD AUTO: 0.02 K/UL
BASOPHILS NFR BLD AUTO: 0.4 %
BILIRUB SERPL-MCNC: 0.2 MG/DL
BILIRUBIN URINE: NEGATIVE
BLOOD URINE: NEGATIVE
BUN SERPL-MCNC: 13 MG/DL
CALCIUM SERPL-MCNC: 9.6 MG/DL
CAST: 0 /LPF
CHLORIDE SERPL-SCNC: 101 MMOL/L
CHOLEST SERPL-MCNC: 156 MG/DL
CO2 SERPL-SCNC: 25 MMOL/L
COLOR: YELLOW
CREAT SERPL-MCNC: 0.9 MG/DL
CREAT SPEC-SCNC: 97 MG/DL
EGFR: 76 ML/MIN/1.73M2
EOSINOPHIL # BLD AUTO: 0.25 K/UL
EOSINOPHIL NFR BLD AUTO: 4.6 %
EPITHELIAL CELLS: 14 /HPF
ESTIMATED AVERAGE GLUCOSE: 183 MG/DL
GLUCOSE QUALITATIVE U: >=1000 MG/DL
GLUCOSE SERPL-MCNC: 168 MG/DL
HBA1C MFR BLD HPLC: 8 %
HCT VFR BLD CALC: 42.7 %
HDLC SERPL-MCNC: 42 MG/DL
HGB BLD-MCNC: 13.2 G/DL
IMM GRANULOCYTES NFR BLD AUTO: 0.2 %
KETONES URINE: NEGATIVE MG/DL
LDLC SERPL CALC-MCNC: 96 MG/DL
LEUKOCYTE ESTERASE URINE: NEGATIVE
LYMPHOCYTES # BLD AUTO: 2.62 K/UL
LYMPHOCYTES NFR BLD AUTO: 47.9 %
MAN DIFF?: NORMAL
MCHC RBC-ENTMCNC: 28.8 PG
MCHC RBC-ENTMCNC: 30.9 G/DL
MCV RBC AUTO: 93 FL
MICROALBUMIN 24H UR DL<=1MG/L-MCNC: 5.4 MG/DL
MICROALBUMIN/CREAT 24H UR-RTO: 56 MG/G
MICROSCOPIC-UA: NORMAL
MONOCYTES # BLD AUTO: 0.34 K/UL
MONOCYTES NFR BLD AUTO: 6.2 %
NEUTROPHILS # BLD AUTO: 2.23 K/UL
NEUTROPHILS NFR BLD AUTO: 40.7 %
NITRITE URINE: NEGATIVE
NONHDLC SERPL-MCNC: 114 MG/DL
PH URINE: 5.5
PLATELET # BLD AUTO: 280 K/UL
POTASSIUM SERPL-SCNC: 4.4 MMOL/L
PROT SERPL-MCNC: 7.2 G/DL
PROTEIN URINE: NORMAL MG/DL
RBC # BLD: 4.59 M/UL
RBC # FLD: 13.1 %
RED BLOOD CELLS URINE: 0 /HPF
REVIEW: NORMAL
SODIUM SERPL-SCNC: 140 MMOL/L
SPECIFIC GRAVITY URINE: >1.03
TRIGL SERPL-MCNC: 100 MG/DL
UROBILINOGEN URINE: 0.2 MG/DL
WBC # FLD AUTO: 5.47 K/UL
WHITE BLOOD CELLS URINE: 1 /HPF
YEAST-LIKE CELLS: PRESENT

## 2025-01-28 ENCOUNTER — NON-APPOINTMENT (OUTPATIENT)
Age: 55
End: 2025-01-28

## 2025-01-28 DIAGNOSIS — R59.0 LOCALIZED ENLARGED LYMPH NODES: ICD-10-CM

## 2025-02-06 ENCOUNTER — NON-APPOINTMENT (OUTPATIENT)
Age: 55
End: 2025-02-06

## 2025-04-22 ENCOUNTER — APPOINTMENT (OUTPATIENT)
Dept: INTERNAL MEDICINE | Facility: CLINIC | Age: 55
End: 2025-04-22
Payer: COMMERCIAL

## 2025-04-22 VITALS
HEIGHT: 70 IN | BODY MASS INDEX: 35.5 KG/M2 | DIASTOLIC BLOOD PRESSURE: 80 MMHG | WEIGHT: 248 LBS | RESPIRATION RATE: 14 BRPM | HEART RATE: 72 BPM | SYSTOLIC BLOOD PRESSURE: 125 MMHG | OXYGEN SATURATION: 98 %

## 2025-04-22 DIAGNOSIS — R80.9 PROTEINURIA, UNSPECIFIED: ICD-10-CM

## 2025-04-22 DIAGNOSIS — E04.1 NONTOXIC SINGLE THYROID NODULE: ICD-10-CM

## 2025-04-22 DIAGNOSIS — E66.9 OBESITY, UNSPECIFIED: ICD-10-CM

## 2025-04-22 DIAGNOSIS — K76.0 FATTY (CHANGE OF) LIVER, NOT ELSEWHERE CLASSIFIED: ICD-10-CM

## 2025-04-22 DIAGNOSIS — E11.9 TYPE 2 DIABETES MELLITUS W/OUT COMPLICATIONS: ICD-10-CM

## 2025-04-22 DIAGNOSIS — J34.89 OTHER SPECIFIED DISORDERS OF NOSE AND NASAL SINUSES: ICD-10-CM

## 2025-04-22 DIAGNOSIS — Z79.899 OTHER LONG TERM (CURRENT) DRUG THERAPY: ICD-10-CM

## 2025-04-22 DIAGNOSIS — E78.5 HYPERLIPIDEMIA, UNSPECIFIED: ICD-10-CM

## 2025-04-22 PROCEDURE — 99214 OFFICE O/P EST MOD 30 MIN: CPT

## 2025-04-22 PROCEDURE — 36415 COLL VENOUS BLD VENIPUNCTURE: CPT

## 2025-04-22 PROCEDURE — G2211 COMPLEX E/M VISIT ADD ON: CPT | Mod: NC

## 2025-04-23 LAB
ALBUMIN SERPL ELPH-MCNC: 4.4 G/DL
ALP BLD-CCNC: 91 U/L
ALT SERPL-CCNC: 19 U/L
ANION GAP SERPL CALC-SCNC: 15 MMOL/L
AST SERPL-CCNC: 17 U/L
BASOPHILS # BLD AUTO: 0.02 K/UL
BASOPHILS NFR BLD AUTO: 0.3 %
BILIRUB SERPL-MCNC: 0.2 MG/DL
BUN SERPL-MCNC: 15 MG/DL
CALCIUM SERPL-MCNC: 9.5 MG/DL
CHLORIDE SERPL-SCNC: 103 MMOL/L
CHOLEST SERPL-MCNC: 147 MG/DL
CO2 SERPL-SCNC: 23 MMOL/L
CREAT 24H UR-MCNC: 2 G/24 H
CREAT ?TM UR-MCNC: 89 MG/DL
CREAT SERPL-MCNC: 0.87 MG/DL
EGFRCR SERPLBLD CKD-EPI 2021: 79 ML/MIN/1.73M2
EOSINOPHIL # BLD AUTO: 0.25 K/UL
EOSINOPHIL NFR BLD AUTO: 4.2 %
ESTIMATED AVERAGE GLUCOSE: 177 MG/DL
GLUCOSE SERPL-MCNC: 155 MG/DL
HBA1C MFR BLD HPLC: 7.8 %
HCT VFR BLD CALC: 42.2 %
HDLC SERPL-MCNC: 45 MG/DL
HGB BLD-MCNC: 13.5 G/DL
IMM GRANULOCYTES NFR BLD AUTO: 0.2 %
LDLC SERPL-MCNC: 83 MG/DL
LYMPHOCYTES # BLD AUTO: 2.85 K/UL
LYMPHOCYTES NFR BLD AUTO: 48 %
MAN DIFF?: NORMAL
MCHC RBC-ENTMCNC: 29.5 PG
MCHC RBC-ENTMCNC: 32 G/DL
MCV RBC AUTO: 92.3 FL
MONOCYTES # BLD AUTO: 0.34 K/UL
MONOCYTES NFR BLD AUTO: 5.7 %
NEUTROPHILS # BLD AUTO: 2.47 K/UL
NEUTROPHILS NFR BLD AUTO: 41.6 %
NONHDLC SERPL-MCNC: 102 MG/DL
PLATELET # BLD AUTO: 303 K/UL
POTASSIUM SERPL-SCNC: 4.2 MMOL/L
PROT 24H UR-MRATE: 8 MG/DL
PROT ?TM UR-MCNC: 24 HR
PROT SERPL-MCNC: 7 G/DL
PROT UR-MCNC: 180 MG/24 H
RBC # BLD: 4.57 M/UL
RBC # FLD: 13.1 %
SODIUM SERPL-SCNC: 142 MMOL/L
SPECIMEN VOL 24H UR: 2250 ML
T4 FREE SERPL-MCNC: 1.2 NG/DL
TRIGL SERPL-MCNC: 99 MG/DL
TSH SERPL-ACNC: 3.88 UIU/ML
WBC # FLD AUTO: 5.94 K/UL

## 2025-04-26 ENCOUNTER — RX RENEWAL (OUTPATIENT)
Age: 55
End: 2025-04-26

## 2025-05-05 ENCOUNTER — APPOINTMENT (OUTPATIENT)
Dept: OTOLARYNGOLOGY | Facility: CLINIC | Age: 55
End: 2025-05-05
Payer: COMMERCIAL

## 2025-05-05 VITALS
SYSTOLIC BLOOD PRESSURE: 131 MMHG | HEART RATE: 73 BPM | DIASTOLIC BLOOD PRESSURE: 83 MMHG | HEIGHT: 70 IN | WEIGHT: 250 LBS | BODY MASS INDEX: 35.79 KG/M2

## 2025-05-05 DIAGNOSIS — R22.0 LOCALIZED SWELLING, MASS AND LUMP, HEAD: ICD-10-CM

## 2025-05-05 DIAGNOSIS — J34.89 OTHER SPECIFIED DISORDERS OF NOSE AND NASAL SINUSES: ICD-10-CM

## 2025-05-05 PROCEDURE — 30100 INTRANASAL BIOPSY: CPT

## 2025-05-05 PROCEDURE — 99203 OFFICE O/P NEW LOW 30 MIN: CPT | Mod: 25

## 2025-05-07 ENCOUNTER — NON-APPOINTMENT (OUTPATIENT)
Age: 55
End: 2025-05-07

## 2025-05-07 LAB — CORE LAB BIOPSY: NORMAL

## 2025-05-27 ENCOUNTER — APPOINTMENT (OUTPATIENT)
Dept: OBGYN | Facility: CLINIC | Age: 55
End: 2025-05-27

## 2025-06-30 ENCOUNTER — RX RENEWAL (OUTPATIENT)
Age: 55
End: 2025-06-30

## 2025-07-24 ENCOUNTER — NON-APPOINTMENT (OUTPATIENT)
Age: 55
End: 2025-07-24

## 2025-07-25 ENCOUNTER — NON-APPOINTMENT (OUTPATIENT)
Age: 55
End: 2025-07-25

## 2025-07-30 ENCOUNTER — APPOINTMENT (OUTPATIENT)
Dept: INTERNAL MEDICINE | Facility: CLINIC | Age: 55
End: 2025-07-30
Payer: COMMERCIAL

## 2025-07-30 VITALS
HEART RATE: 76 BPM | DIASTOLIC BLOOD PRESSURE: 78 MMHG | RESPIRATION RATE: 13 BRPM | BODY MASS INDEX: 35.44 KG/M2 | WEIGHT: 247 LBS | SYSTOLIC BLOOD PRESSURE: 125 MMHG

## 2025-07-30 DIAGNOSIS — E78.5 HYPERLIPIDEMIA, UNSPECIFIED: ICD-10-CM

## 2025-07-30 DIAGNOSIS — K76.0 FATTY (CHANGE OF) LIVER, NOT ELSEWHERE CLASSIFIED: ICD-10-CM

## 2025-07-30 DIAGNOSIS — Z11.59 ENCOUNTER FOR SCREENING FOR OTHER VIRAL DISEASES: ICD-10-CM

## 2025-07-30 DIAGNOSIS — Z79.899 OTHER LONG TERM (CURRENT) DRUG THERAPY: ICD-10-CM

## 2025-07-30 DIAGNOSIS — E66.9 OBESITY, UNSPECIFIED: ICD-10-CM

## 2025-07-30 DIAGNOSIS — E11.65 TYPE 2 DIABETES MELLITUS WITH HYPERGLYCEMIA: ICD-10-CM

## 2025-07-30 DIAGNOSIS — E04.1 NONTOXIC SINGLE THYROID NODULE: ICD-10-CM

## 2025-07-30 PROCEDURE — G2211 COMPLEX E/M VISIT ADD ON: CPT | Mod: NC

## 2025-07-30 PROCEDURE — 36415 COLL VENOUS BLD VENIPUNCTURE: CPT

## 2025-07-30 PROCEDURE — 99214 OFFICE O/P EST MOD 30 MIN: CPT

## 2025-07-31 LAB
ALBUMIN SERPL ELPH-MCNC: 4.3 G/DL
ALP BLD-CCNC: 106 U/L
ALT SERPL-CCNC: 25 U/L
ANION GAP SERPL CALC-SCNC: 19 MMOL/L
AST SERPL-CCNC: 20 U/L
BASOPHILS # BLD AUTO: 0.03 K/UL
BASOPHILS NFR BLD AUTO: 0.5 %
BILIRUB SERPL-MCNC: 0.3 MG/DL
BUN SERPL-MCNC: 14 MG/DL
CALCIUM SERPL-MCNC: 9.8 MG/DL
CHLORIDE SERPL-SCNC: 103 MMOL/L
CHOLEST SERPL-MCNC: 149 MG/DL
CO2 SERPL-SCNC: 19 MMOL/L
CREAT SERPL-MCNC: 0.76 MG/DL
EGFRCR SERPLBLD CKD-EPI 2021: 92 ML/MIN/1.73M2
EOSINOPHIL # BLD AUTO: 0.21 K/UL
EOSINOPHIL NFR BLD AUTO: 3.6 %
ESTIMATED AVERAGE GLUCOSE: 180 MG/DL
GLUCOSE SERPL-MCNC: 160 MG/DL
HBA1C MFR BLD HPLC: 7.9 %
HCT VFR BLD CALC: 42 %
HCV AB SER QL: NONREACTIVE
HCV S/CO RATIO: 0.07 S/CO
HDLC SERPL-MCNC: 43 MG/DL
HGB BLD-MCNC: 13.5 G/DL
IMM GRANULOCYTES NFR BLD AUTO: 0.2 %
LDLC SERPL-MCNC: 87 MG/DL
LYMPHOCYTES # BLD AUTO: 3.14 K/UL
LYMPHOCYTES NFR BLD AUTO: 54.2 %
MAN DIFF?: NORMAL
MCHC RBC-ENTMCNC: 29.3 PG
MCHC RBC-ENTMCNC: 32.1 G/DL
MCV RBC AUTO: 91.1 FL
MONOCYTES # BLD AUTO: 0.32 K/UL
MONOCYTES NFR BLD AUTO: 5.5 %
NEUTROPHILS # BLD AUTO: 2.08 K/UL
NEUTROPHILS NFR BLD AUTO: 36 %
NONHDLC SERPL-MCNC: 106 MG/DL
PLATELET # BLD AUTO: 334 K/UL
POTASSIUM SERPL-SCNC: 4.3 MMOL/L
PROT SERPL-MCNC: 6.9 G/DL
RBC # BLD: 4.61 M/UL
RBC # FLD: 13.2 %
SODIUM SERPL-SCNC: 141 MMOL/L
T4 FREE SERPL-MCNC: 1.2 NG/DL
TRIGL SERPL-MCNC: 101 MG/DL
TSH SERPL-ACNC: 3.23 UIU/ML
WBC # FLD AUTO: 5.79 K/UL

## 2025-09-18 ENCOUNTER — NON-APPOINTMENT (OUTPATIENT)
Age: 55
End: 2025-09-18

## 2025-09-19 ENCOUNTER — APPOINTMENT (OUTPATIENT)
Dept: INTERNAL MEDICINE | Facility: CLINIC | Age: 55
End: 2025-09-19
Payer: COMMERCIAL

## 2025-09-19 VITALS
DIASTOLIC BLOOD PRESSURE: 80 MMHG | TEMPERATURE: 97 F | WEIGHT: 244 LBS | SYSTOLIC BLOOD PRESSURE: 130 MMHG | HEIGHT: 70 IN | RESPIRATION RATE: 13 BRPM | BODY MASS INDEX: 34.93 KG/M2 | HEART RATE: 72 BPM

## 2025-09-19 DIAGNOSIS — Z11.59 ENCOUNTER FOR SCREENING FOR OTHER VIRAL DISEASES: ICD-10-CM

## 2025-09-19 DIAGNOSIS — J06.9 ACUTE UPPER RESPIRATORY INFECTION, UNSPECIFIED: ICD-10-CM

## 2025-09-19 PROCEDURE — G2211 COMPLEX E/M VISIT ADD ON: CPT | Mod: NC

## 2025-09-19 PROCEDURE — 99213 OFFICE O/P EST LOW 20 MIN: CPT

## 2025-09-19 RX ORDER — AZITHROMYCIN 250 MG/1
250 TABLET, FILM COATED ORAL
Qty: 1 | Refills: 0 | Status: ACTIVE | COMMUNITY
Start: 2025-09-19 | End: 1900-01-01

## 2025-09-19 RX ORDER — HYDROCODONE BITARTRATE AND HOMATROPINE METHYLBROMIDE 1.5; 5 MG/5ML; MG/5ML
5-1.5 SOLUTION ORAL
Qty: 120 | Refills: 0 | Status: ACTIVE | COMMUNITY
Start: 2025-09-19 | End: 1900-01-01

## 2025-09-20 LAB
INFLUENZA A RESULT: NOT DETECTED
INFLUENZA B RESULT: NOT DETECTED
RESP SYN VIRUS RESULT: NOT DETECTED
SARS-COV-2 RESULT: NOT DETECTED
SOURCE CONJUNCTIVA: NORMAL